# Patient Record
Sex: FEMALE | Race: WHITE | NOT HISPANIC OR LATINO | Employment: FULL TIME | URBAN - METROPOLITAN AREA
[De-identification: names, ages, dates, MRNs, and addresses within clinical notes are randomized per-mention and may not be internally consistent; named-entity substitution may affect disease eponyms.]

---

## 2023-01-25 PROBLEM — N93.9 ABNORMAL UTERINE BLEEDING (AUB): Status: ACTIVE | Noted: 2023-01-25

## 2023-01-25 PROBLEM — E66.9 OBESITY (BMI 30-39.9): Status: ACTIVE | Noted: 2023-01-25

## 2023-01-25 PROBLEM — F32.A DEPRESSION: Status: ACTIVE | Noted: 2023-01-25

## 2023-01-25 PROBLEM — F41.0 PANIC ATTACKS: Status: ACTIVE | Noted: 2023-01-25

## 2023-01-25 PROBLEM — R68.89 COLD INTOLERANCE: Status: ACTIVE | Noted: 2023-01-25

## 2023-01-25 PROBLEM — M54.2 CHRONIC NECK PAIN: Status: ACTIVE | Noted: 2023-01-25

## 2023-01-25 PROBLEM — N94.6 DYSMENORRHEA: Status: ACTIVE | Noted: 2023-01-25

## 2023-01-25 PROBLEM — G89.29 CHRONIC NECK PAIN: Status: ACTIVE | Noted: 2023-01-25

## 2023-01-25 PROBLEM — R53.83 FATIGUE: Status: ACTIVE | Noted: 2023-01-25

## 2023-01-25 PROBLEM — M54.6 ACUTE MIDLINE THORACIC BACK PAIN: Status: ACTIVE | Noted: 2023-01-25

## 2023-01-25 RX ORDER — ONDANSETRON 4 MG/1
1 TABLET, ORALLY DISINTEGRATING ORAL EVERY 8 HOURS PRN
COMMUNITY
End: 2023-04-04 | Stop reason: WASHOUT

## 2023-01-25 RX ORDER — MEDROXYPROGESTERONE ACETATE 10 MG/1
1 TABLET ORAL 2 TIMES DAILY
COMMUNITY
Start: 2020-03-06 | End: 2023-04-04 | Stop reason: WASHOUT

## 2023-01-25 RX ORDER — ASPIRIN 325 MG
TABLET ORAL
COMMUNITY
Start: 2020-03-06 | End: 2023-11-20 | Stop reason: WASHOUT

## 2023-01-25 RX ORDER — ESCITALOPRAM OXALATE 10 MG/1
1 TABLET ORAL DAILY
COMMUNITY
Start: 2021-07-30 | End: 2023-07-25 | Stop reason: SDUPTHER

## 2023-01-25 RX ORDER — BUSPIRONE HYDROCHLORIDE 15 MG/1
1 TABLET ORAL 3 TIMES DAILY PRN
COMMUNITY
Start: 2021-08-30 | End: 2023-08-30 | Stop reason: SDUPTHER

## 2023-01-25 RX ORDER — BACLOFEN 10 MG/1
1 TABLET ORAL 3 TIMES DAILY PRN
COMMUNITY
Start: 2022-01-13 | End: 2023-11-20 | Stop reason: SDUPTHER

## 2023-01-25 RX ORDER — FOLIC ACID/MULTIVIT,IRON,MINER 0.4MG-18MG
TABLET ORAL
COMMUNITY
Start: 2020-03-06 | End: 2023-04-04 | Stop reason: WASHOUT

## 2023-03-09 ENCOUNTER — APPOINTMENT (OUTPATIENT)
Dept: PRIMARY CARE | Facility: CLINIC | Age: 38
End: 2023-03-09

## 2023-04-03 NOTE — PROGRESS NOTES
"Subjective   Patient ID: Priya Lopez is a 37 y.o. female who presents for Panic Attack and Neck Pain    Panic Attacks:   Patient reports taking Lexapro 10 mg daily as prescribed and not excessively. Denies any side effects. Patient is reporting stability in condition. She would like to wean down on antidepressants next visit.  Her divorce is final and she has a new job that is less stressful.    Neck pain:   Priya Lopez is here to follow up on chronic neck pain. There are no new injuries reported. The pain medications provide adequate pain control. The patient reports no side effects with the medications. The patient is able to do their ADLs. Patient believes the pain is due to her jaw. She had her wisdom teeth taken out, believes they popped her jaw out. She went to see a provider for this. Went on a cruise and had acupuncture which did relieve some of the symptoms.     She reports left ankle pain and edema. She was wearing a brace and it helps a little. Is seeking a referral to podiatry. Left heel pain, worse in the morning with first steps. Does not go bare foot hardly at all.     Review of Systems   HENT:  Positive for dental problem.    Musculoskeletal:  Positive for gait problem and neck pain.        Left ankle pain and edema. Seeking a referral to podiatry.      /72   Pulse 78   Temp 36 °C (96.8 °F)   Resp 14   Ht 1.626 m (5' 4\")   Wt 95.9 kg (211 lb 6.4 oz)   SpO2 97%   BMI 36.29 kg/m²     Objective   Physical Exam  Vitals reviewed.   Constitutional:       Appearance: Normal appearance. She is obese.   Cardiovascular:      Rate and Rhythm: Normal rate and regular rhythm.      Pulses: Normal pulses.      Heart sounds: Normal heart sounds.   Pulmonary:      Effort: Pulmonary effort is normal.      Breath sounds: Normal breath sounds.   Abdominal:      General: Abdomen is flat.      Palpations: Abdomen is soft.   Musculoskeletal:         General: Tenderness present.      Cervical back: " Normal range of motion and neck supple.      Left lower leg: Edema present.      Comments: Left heel pain at the plantar fascia. Plus edema.   Neurological:      Mental Status: She is alert.       Assessment/Plan   Problem List Items Addressed This Visit          Nervous    Chronic neck pain - Primary    Overview      Is well controlled, continue with current medications.           Relevant Orders    Follow Up In [facility] - PCP       Musculoskeletal    Plantar fasciitis, bilateral    Overview      Is present and symptomatic, will need treatment. Referral to podiatry sent.          Relevant Orders    Referral to Podiatry       Endocrine/Metabolic    Obesity (BMI 30-39.9)    Overview      Is poorly controlled, therapeutic changes necessary.          Relevant Orders    Follow Up In [facility] - PCP       Other    Depression    Panic attacks    Overview      Is well controlled, continue with current medications.  Will start to wean off Buspar 15 mg to 1/2 tablet 3 times daily PRN.          Relevant Orders    Follow Up In [facility] - PCP       Follow-up in 2 months.    Scribe Attestation  By signing my name below, IMaría Elena , Scribdeb   attest that this documentation has been prepared under the direction and in the presence of Emory Wei MD.

## 2023-04-04 ENCOUNTER — OFFICE VISIT (OUTPATIENT)
Dept: PRIMARY CARE | Facility: CLINIC | Age: 38
End: 2023-04-04
Payer: COMMERCIAL

## 2023-04-04 VITALS
HEIGHT: 64 IN | WEIGHT: 211.4 LBS | SYSTOLIC BLOOD PRESSURE: 112 MMHG | DIASTOLIC BLOOD PRESSURE: 72 MMHG | OXYGEN SATURATION: 97 % | RESPIRATION RATE: 14 BRPM | TEMPERATURE: 96.8 F | HEART RATE: 78 BPM | BODY MASS INDEX: 36.09 KG/M2

## 2023-04-04 DIAGNOSIS — F41.0 PANIC ATTACKS: ICD-10-CM

## 2023-04-04 DIAGNOSIS — M72.2 PLANTAR FASCIITIS, BILATERAL: ICD-10-CM

## 2023-04-04 DIAGNOSIS — E66.9 OBESITY (BMI 30-39.9): ICD-10-CM

## 2023-04-04 DIAGNOSIS — M54.2 CHRONIC NECK PAIN: Primary | ICD-10-CM

## 2023-04-04 DIAGNOSIS — F32.4 MAJOR DEPRESSIVE DISORDER WITH SINGLE EPISODE, IN PARTIAL REMISSION (CMS-HCC): ICD-10-CM

## 2023-04-04 DIAGNOSIS — G89.29 CHRONIC NECK PAIN: Primary | ICD-10-CM

## 2023-04-04 PROBLEM — M25.562 LEFT KNEE PAIN: Status: ACTIVE | Noted: 2017-05-31

## 2023-04-04 PROBLEM — L91.8 INFLAMED ACROCHORDON: Status: ACTIVE | Noted: 2017-11-09

## 2023-04-04 PROBLEM — I67.1 CEREBRAL ANEURYSM, NONRUPTURED (HHS-HCC): Status: ACTIVE | Noted: 2017-03-16

## 2023-04-04 PROBLEM — K59.09 CHRONIC CONSTIPATION: Status: ACTIVE | Noted: 2017-03-17

## 2023-04-04 PROBLEM — N92.1 MENOMETRORRHAGIA: Status: ACTIVE | Noted: 2017-03-17

## 2023-04-04 PROCEDURE — 99214 OFFICE O/P EST MOD 30 MIN: CPT | Performed by: FAMILY MEDICINE

## 2023-04-04 PROCEDURE — 1036F TOBACCO NON-USER: CPT | Performed by: FAMILY MEDICINE

## 2023-04-04 RX ORDER — NORETHINDRONE 0.35 MG/1
1 TABLET ORAL DAILY
COMMUNITY
End: 2023-11-20 | Stop reason: WASHOUT

## 2023-04-04 ASSESSMENT — ENCOUNTER SYMPTOMS: NECK PAIN: 1

## 2023-05-25 ENCOUNTER — OFFICE VISIT (OUTPATIENT)
Dept: PRIMARY CARE | Facility: CLINIC | Age: 38
End: 2023-05-25
Payer: COMMERCIAL

## 2023-05-25 VITALS
DIASTOLIC BLOOD PRESSURE: 78 MMHG | SYSTOLIC BLOOD PRESSURE: 120 MMHG | TEMPERATURE: 98.3 F | HEIGHT: 64 IN | HEART RATE: 64 BPM | BODY MASS INDEX: 35.55 KG/M2 | RESPIRATION RATE: 16 BRPM | OXYGEN SATURATION: 99 % | WEIGHT: 208.2 LBS

## 2023-05-25 DIAGNOSIS — E66.9 OBESITY (BMI 30-39.9): ICD-10-CM

## 2023-05-25 DIAGNOSIS — F32.4 MAJOR DEPRESSIVE DISORDER WITH SINGLE EPISODE, IN PARTIAL REMISSION (CMS-HCC): Primary | ICD-10-CM

## 2023-05-25 DIAGNOSIS — M54.2 CHRONIC NECK PAIN: ICD-10-CM

## 2023-05-25 DIAGNOSIS — F41.0 PANIC ATTACKS: ICD-10-CM

## 2023-05-25 DIAGNOSIS — G89.29 CHRONIC NECK PAIN: ICD-10-CM

## 2023-05-25 DIAGNOSIS — M72.2 PLANTAR FASCIITIS, BILATERAL: ICD-10-CM

## 2023-05-25 PROCEDURE — 1036F TOBACCO NON-USER: CPT | Performed by: FAMILY MEDICINE

## 2023-05-25 PROCEDURE — 99213 OFFICE O/P EST LOW 20 MIN: CPT | Performed by: FAMILY MEDICINE

## 2023-05-25 NOTE — PROGRESS NOTES
"Subjective   Patient ID:  Priya Lopez is a 37 y.o. female patient who presents today for Anxiety and Depression (She would like to begin being weaned off her medication due to less stress in her life.)    Past Medical, Surgical, and Family History reviewed and updated in chart.     Reviewed all medications by prescribing practitioner or clinical pharmacist (such as prescriptions, OTCs, herbal therapies and supplements) and documented in the medical record.     Anxiety:  The patient is presenting today for a follow up of anxiety disorder. She denies having any current suicidal and/or homicidal ideation.  Patient denies any side effects to the medications. She would like to begin to be weaned off her medication due to the stress in her life has lessened.     OARRS report and controlled substance form reviewed.    Depression: Patient is presenting today for a follow up of depression. She voices that they are doing very well. The patient is compliant with medications. Patient denies any side effects to the medications.     The patient denies having the following symptoms: chest pain, chest pressure, fever, chills, N/V/D, constipation, dizziness, headaches, SOB.    Patient Care Team:  Emory Wei MD as PCP - General    Objective   Vitals:  /78   Pulse 64   Temp 36.8 °C (98.3 °F)   Resp 16   Ht 1.626 m (5' 4\")   Wt 94.4 kg (208 lb 3.2 oz)   SpO2 99%   BMI 35.74 kg/m²     Physical Exam  Vitals reviewed.   Constitutional:       Appearance: Normal appearance. She is obese.   HENT:      Right Ear: Tympanic membrane and ear canal normal.      Left Ear: Tympanic membrane and ear canal normal.      Mouth/Throat:      Pharynx: Oropharynx is clear.   Eyes:      Extraocular Movements: Extraocular movements intact.      Conjunctiva/sclera: Conjunctivae normal.      Pupils: Pupils are equal, round, and reactive to light.   Cardiovascular:      Rate and Rhythm: Normal rate and regular rhythm.      Heart sounds: " Normal heart sounds.   Pulmonary:      Effort: Pulmonary effort is normal. No respiratory distress.      Breath sounds: Normal breath sounds.   Abdominal:      General: There is no distension.      Palpations: There is no mass.      Tenderness: There is no abdominal tenderness. There is no guarding or rebound.      Hernia: No hernia is present.   Musculoskeletal:      Cervical back: Neck supple.   Skin:     General: Skin is warm and dry.   Neurological:      Mental Status: She is alert and oriented to person, place, and time.         Assessment/Plan   Problem List Items Addressed This Visit          Nervous    Chronic neck pain    Overview                Current Assessment & Plan      Is well controlled, continue with current medications.             Musculoskeletal    Plantar fasciitis, bilateral    Current Assessment & Plan      Is present and symptomatic, will need treatment. Referral to podiatry sent.             Endocrine/Metabolic    Obesity (BMI 30-39.9)    Overview      Is poorly controlled, therapeutic changes necessary.             Other    Depression - Primary    Current Assessment & Plan      Is well controlled, continue with current medications.          Panic attacks    Current Assessment & Plan      Is stable, continue with current treatment.             Follow up in: 6 months or sooner if needed with labs prior.     Scribe Attestation  By signing my name below, I, María Elena Benavidez , Lon   attest that this documentation has been prepared under the direction and in the presence of Emory Wei MD.

## 2023-07-25 DIAGNOSIS — F32.4 MAJOR DEPRESSIVE DISORDER WITH SINGLE EPISODE, IN PARTIAL REMISSION (CMS-HCC): ICD-10-CM

## 2023-07-25 RX ORDER — ESCITALOPRAM OXALATE 10 MG/1
10 TABLET ORAL DAILY
Qty: 30 TABLET | Refills: 5 | Status: SHIPPED | OUTPATIENT
Start: 2023-07-25 | End: 2023-08-30 | Stop reason: SDUPTHER

## 2023-07-25 NOTE — TELEPHONE ENCOUNTER
Recent Visits  Date Type Provider Dept   05/25/23 Office Visit Emory Wei MD Do [id number]   04/04/23 Office Visit Emory Wei MD Do [id number]   Showing recent visits within past 540 days and meeting all other requirements  Future Appointments  Date Type Provider Dept   11/20/23 Appointment Emory Wei MD Do [id number] [facility]   Showing future appointments within next 180 days and meeting all other requirements

## 2023-08-30 DIAGNOSIS — F32.4 MAJOR DEPRESSIVE DISORDER WITH SINGLE EPISODE, IN PARTIAL REMISSION (CMS-HCC): ICD-10-CM

## 2023-08-30 RX ORDER — ESCITALOPRAM OXALATE 10 MG/1
10 TABLET ORAL DAILY
Qty: 30 TABLET | Refills: 1 | Status: SHIPPED | OUTPATIENT
Start: 2023-08-30 | End: 2023-11-06 | Stop reason: SDUPTHER

## 2023-08-30 RX ORDER — BUSPIRONE HYDROCHLORIDE 15 MG/1
15 TABLET ORAL 3 TIMES DAILY PRN
Qty: 90 TABLET | Refills: 0 | Status: SHIPPED | OUTPATIENT
Start: 2023-08-30 | End: 2023-11-20 | Stop reason: SDUPTHER

## 2023-08-30 NOTE — TELEPHONE ENCOUNTER
Rx Refill Request Telephone Encounter    Name:  Priya Lopez  :  [id number]  Medication Name:  buspirone and lexapro  - the previous prescription was sent to the incorrect pharmacy   Specific Pharmacy location:  [facility]   Date of last appointment:  23   Date of next appointment:  23   Best number to reach patient:  [phone]

## 2023-11-06 DIAGNOSIS — F32.4 MAJOR DEPRESSIVE DISORDER WITH SINGLE EPISODE, IN PARTIAL REMISSION (CMS-HCC): Primary | ICD-10-CM

## 2023-11-06 RX ORDER — ESCITALOPRAM OXALATE 10 MG/1
10 TABLET ORAL DAILY
Qty: 30 TABLET | Refills: 1 | Status: SHIPPED | OUTPATIENT
Start: 2023-11-06 | End: 2023-11-20 | Stop reason: SDUPTHER

## 2023-11-06 NOTE — TELEPHONE ENCOUNTER
Rx Refill Request Telephone Encounter    Name:  Priya Lopez  :  [id number]  Medication Name:  LEXAPRO 10MG  Specific Pharmacy location:  [facility]   Date of last appointment:  23  Date of next appointment:  23  Best number to reach patient:  [phone]

## 2023-11-19 NOTE — PROGRESS NOTES
"Subjective    Patient ID: Priya Lopez is a 38 y.o. female who presents for Depression, Obesity, and Hypothyroidism (Patient states one side of her hair is growing longer than the other side. She was told he have her thyroid checked).     Depression: Patient is presenting today for a follow up of depression. She voices that they are doing marginally.     Hair thinning: Patient presents for a follow up of their thyroid function. Energy wise that patient is marginally. Patient states that one side of her hair is growing longer than the other side, she is inquiring about having her thyroid checked.     Obesity: The patient is present for a follow up for obesity. The patient is continuously working on diet and exercise. The patient will continue working on strategies to maintain weight loss and stay well hydrated.      Neck Pain: The patient is present today for a follow up of neck pain. Denies side effects from medications.     Aneurysm: The patient is present for a follow up of an aneurysm. She reports to be in stable condition.       The patient denies having the following symptoms: chest pain, chest pressure, fever, chills, N/V/D, constipation, dizziness, headaches, SOB.    Objective   Vitals:  /68   Pulse 72   Temp 36.2 °C (97.1 °F)   Resp 16   Ht 1.626 m (5' 4\")   Wt 99.9 kg (220 lb 3.2 oz)   SpO2 98%   BMI 37.80 kg/m²     Physical Exam  Vitals reviewed.   Constitutional:       Appearance: Normal appearance. She is obese.   Neck:      Vascular: No carotid bruit.   Cardiovascular:      Rate and Rhythm: Normal rate and regular rhythm.      Pulses: Normal pulses.      Heart sounds: Normal heart sounds.   Pulmonary:      Effort: Pulmonary effort is normal. No respiratory distress.      Breath sounds: Normal breath sounds. No wheezing.   Abdominal:      General: There is no distension.      Palpations: Abdomen is soft. There is no mass.      Tenderness: There is no abdominal tenderness. There is no " right CVA tenderness, left CVA tenderness, guarding or rebound.   Musculoskeletal:      Cervical back: Normal range of motion and neck supple. No rigidity.      Right lower leg: No edema.      Left lower leg: No edema.   Lymphadenopathy:      Cervical: No cervical adenopathy.   Neurological:      Mental Status: She is alert.          Labs active- future.     Assessment/Plan   Problem List Items Addressed This Visit       Chronic neck pain - Primary      Is well controlled, continue with current medications.          Relevant Medications    baclofen (Lioresal) 10 mg tablet    Depression      Is well controlled, continue with current medications.          Relevant Medications    busPIRone (Buspar) 15 mg tablet    escitalopram (Lexapro) 10 mg tablet    Aneurysm (CMS/HCC)      Is stable, continue with current treatment.          Cerebral aneurysm, nonruptured    Thinning hair      Is present and symptomatic, will need treatment.          Relevant Orders    CBC and Auto Differential    Comprehensive Metabolic Panel    Lipid Panel    Magnesium    TSH with reflex to Free T4 if abnormal    Follow Up In [facility] - PCP - Established       Follow up in: 6 month(s) or sooner if needed with labs prior.     Scribe Attestation  By signing my name below, I, Lon Solares   attest that this documentation has been prepared under the direction and in the presence of Emory Wei MD.

## 2023-11-20 ENCOUNTER — OFFICE VISIT (OUTPATIENT)
Dept: PRIMARY CARE | Facility: CLINIC | Age: 38
End: 2023-11-20
Payer: COMMERCIAL

## 2023-11-20 VITALS
HEART RATE: 72 BPM | TEMPERATURE: 97.1 F | HEIGHT: 64 IN | WEIGHT: 220.2 LBS | OXYGEN SATURATION: 98 % | DIASTOLIC BLOOD PRESSURE: 68 MMHG | RESPIRATION RATE: 16 BRPM | SYSTOLIC BLOOD PRESSURE: 124 MMHG | BODY MASS INDEX: 37.59 KG/M2

## 2023-11-20 DIAGNOSIS — G89.29 CHRONIC NECK PAIN: Primary | ICD-10-CM

## 2023-11-20 DIAGNOSIS — I72.9 ANEURYSM (CMS-HCC): ICD-10-CM

## 2023-11-20 DIAGNOSIS — F32.4 MAJOR DEPRESSIVE DISORDER WITH SINGLE EPISODE, IN PARTIAL REMISSION (CMS-HCC): ICD-10-CM

## 2023-11-20 DIAGNOSIS — L65.9 THINNING HAIR: ICD-10-CM

## 2023-11-20 DIAGNOSIS — M54.2 CHRONIC NECK PAIN: Primary | ICD-10-CM

## 2023-11-20 PROBLEM — N93.9 ABNORMAL UTERINE BLEEDING (AUB): Status: RESOLVED | Noted: 2023-01-25 | Resolved: 2023-11-20

## 2023-11-20 PROBLEM — N92.1 MENOMETRORRHAGIA: Status: RESOLVED | Noted: 2017-03-17 | Resolved: 2023-11-20

## 2023-11-20 PROCEDURE — 99214 OFFICE O/P EST MOD 30 MIN: CPT | Performed by: FAMILY MEDICINE

## 2023-11-20 PROCEDURE — 1036F TOBACCO NON-USER: CPT | Performed by: FAMILY MEDICINE

## 2023-11-20 RX ORDER — ESCITALOPRAM OXALATE 10 MG/1
10 TABLET ORAL DAILY
Qty: 30 TABLET | Refills: 5 | Status: SHIPPED | OUTPATIENT
Start: 2023-11-20

## 2023-11-20 RX ORDER — BACLOFEN 10 MG/1
10 TABLET ORAL 3 TIMES DAILY PRN
Qty: 90 TABLET | Refills: 5 | Status: SHIPPED | OUTPATIENT
Start: 2023-11-20

## 2023-11-20 RX ORDER — BUSPIRONE HYDROCHLORIDE 15 MG/1
15 TABLET ORAL 3 TIMES DAILY PRN
Qty: 90 TABLET | Refills: 5 | Status: SHIPPED | OUTPATIENT
Start: 2023-11-20

## 2023-12-08 ENCOUNTER — TELEPHONE (OUTPATIENT)
Dept: PRIMARY CARE | Facility: CLINIC | Age: 38
End: 2023-12-08
Payer: COMMERCIAL

## 2024-04-17 ENCOUNTER — HOSPITAL ENCOUNTER (OUTPATIENT)
Dept: RADIOLOGY | Facility: CLINIC | Age: 39
Discharge: HOME | End: 2024-04-17
Payer: COMMERCIAL

## 2024-04-17 DIAGNOSIS — S99.911A RIGHT ANKLE INJURY, INITIAL ENCOUNTER: ICD-10-CM

## 2024-04-17 PROCEDURE — 73610 X-RAY EXAM OF ANKLE: CPT | Mod: RIGHT SIDE | Performed by: RADIOLOGY

## 2024-04-17 PROCEDURE — 73610 X-RAY EXAM OF ANKLE: CPT | Mod: RT

## 2024-04-18 ENCOUNTER — TELEPHONE (OUTPATIENT)
Dept: PRIMARY CARE | Facility: CLINIC | Age: 39
End: 2024-04-18
Payer: COMMERCIAL

## 2024-04-30 ENCOUNTER — TELEPHONE (OUTPATIENT)
Dept: PRIMARY CARE | Facility: CLINIC | Age: 39
End: 2024-04-30
Payer: COMMERCIAL

## 2024-04-30 NOTE — TELEPHONE ENCOUNTER
Pt states she went to  in [place] for treatment of her right ankle. They prescribed her an air cast. Pt states she ordered one off [facility] instead and would like to know how long she should leave the air cast on for. Please advise.

## 2024-05-03 NOTE — TELEPHONE ENCOUNTER
I called and left a very detailed message for the pt and if she has any questions or concerns to call the office back.

## 2024-05-20 ENCOUNTER — APPOINTMENT (OUTPATIENT)
Dept: PRIMARY CARE | Facility: CLINIC | Age: 39
End: 2024-05-20
Payer: COMMERCIAL

## 2024-07-17 DIAGNOSIS — F32.4 MAJOR DEPRESSIVE DISORDER WITH SINGLE EPISODE, IN PARTIAL REMISSION (CMS-HCC): ICD-10-CM

## 2024-07-17 RX ORDER — ESCITALOPRAM OXALATE 10 MG/1
10 TABLET ORAL DAILY
Qty: 30 TABLET | Refills: 5 | Status: SHIPPED | OUTPATIENT
Start: 2024-07-17

## 2024-07-17 NOTE — TELEPHONE ENCOUNTER
Rx Refill Request     Name: Priya Lopez  :  1985   Medication Name:  escitalopram   Specific Pharmacy location:  [facility]   Date of last appointment:  2023  Date of next appointment:  Visit date not found   Best number to reach patient:  [phone]

## 2024-08-05 NOTE — PROGRESS NOTES
"Subjective   Patient ID: Priya Lopez is a 38 y.o. female who presents for Depression, Anxiety, Neck Pain, Obesity, and Rectal Pain (Having anal pain, have tired medication for it and has not helped. ).    Depression: Patient is presenting today for a follow up of depression. She voices that they are doing well. She is taking escitalopram 5 mg daily. She has gained weight despite eating better and exercising. She is doing well on 5 mg. She is planning to wean off the escitalopram.     Anxiety:  The patient is presenting today for a follow up of anxiety disorder. She denies having any current suicidal and/or homicidal ideation. She is taking buspirone 15 mg three times daily as needed.     Neck Pain: She is taking baclofen 10 mg three times daily as needed.     Rectal Pain: she has used baby cream, hemorrhoids ointments, and other which have not helped. She says that her perineum has had bumps and she went to the OB/GYN to get tested for STDs. She has researched her symptoms on [employer] and says that it might be perianal \"ridge\" or HPV. She says that the bumps in the area have enlarged. It hurts when she urinates and the it is associated with pruritus. She is in a monogamous relationship.     Right ankle injury: She is doing well. The x rays did not show any fracture or dislocation. There was mild edema. She has been wearing brace when she engages in physical activity,     She would like a test for her thyroid because her hair is still growing.         Objective   /80   Pulse 86   Temp 36.5 °C (97.7 °F)   Resp 16   Ht 1.626 m (5' 4\")   Wt 99.6 kg (219 lb 9.6 oz)   SpO2 99%   BMI 37.69 kg/m²     Physical Exam  Vitals reviewed.   Constitutional:       Appearance: Normal appearance.   Neck:      Vascular: No carotid bruit.   Cardiovascular:      Rate and Rhythm: Normal rate and regular rhythm.      Pulses: Normal pulses.      Heart sounds: Normal heart sounds.   Pulmonary:      Effort: Pulmonary effort " is normal. No respiratory distress.      Breath sounds: Normal breath sounds. No wheezing.   Abdominal:      General: There is no distension.      Palpations: Abdomen is soft. There is no mass.      Tenderness: There is no abdominal tenderness. There is no right CVA tenderness, left CVA tenderness, guarding or rebound.   Musculoskeletal:      Cervical back: Normal range of motion and neck supple. No rigidity.      Right lower leg: No edema.      Left lower leg: No edema.   Lymphadenopathy:      Cervical: No cervical adenopathy.   Neurological:      Mental Status: She is alert.     Picture of perineum shows some growths, question condyloma.    Labs reviewed from :    2020      Assessment/Plan   Problem List Items Addressed This Visit       Depression     Patient doing well. Has reduced the dose of escitalopram to 5 mg. Will discontinue escitalopram and monitor response.          Relevant Orders    CBC and Auto Differential    Comprehensive Metabolic Panel    Magnesium    Anxiety - Primary      Is stable, continue with current treatment.           Relevant Orders    Follow Up In [facility] - PCP - Established    Rectal pain     Will refer to Dr. Pedraza in general surgery for further evaluation.          Relevant Orders    Referral to General Surgery     Other Visit Diagnoses       Screening, lipid        Relevant Orders    Lipid Panel                Follow up in: 6 months or sooner if needed with labs today and prior to next appointment.     Scribe Attestation  By signing my name below, IAmanda , Lon   attest that this documentation has been prepared under the direction and in the presence of Emory Wei MD.

## 2024-08-06 ENCOUNTER — LAB (OUTPATIENT)
Dept: LAB | Facility: LAB | Age: 39
End: 2024-08-06
Payer: COMMERCIAL

## 2024-08-06 ENCOUNTER — OFFICE VISIT (OUTPATIENT)
Dept: PRIMARY CARE | Facility: CLINIC | Age: 39
End: 2024-08-06
Payer: COMMERCIAL

## 2024-08-06 VITALS
WEIGHT: 219.6 LBS | RESPIRATION RATE: 16 BRPM | HEIGHT: 64 IN | TEMPERATURE: 97.7 F | HEART RATE: 86 BPM | BODY MASS INDEX: 37.49 KG/M2 | SYSTOLIC BLOOD PRESSURE: 120 MMHG | OXYGEN SATURATION: 99 % | DIASTOLIC BLOOD PRESSURE: 80 MMHG

## 2024-08-06 DIAGNOSIS — K62.89 RECTAL PAIN: ICD-10-CM

## 2024-08-06 DIAGNOSIS — F32.4 MAJOR DEPRESSIVE DISORDER WITH SINGLE EPISODE, IN PARTIAL REMISSION (CMS-HCC): ICD-10-CM

## 2024-08-06 DIAGNOSIS — Z13.220 SCREENING, LIPID: ICD-10-CM

## 2024-08-06 DIAGNOSIS — L65.9 THINNING HAIR: ICD-10-CM

## 2024-08-06 DIAGNOSIS — F41.9 ANXIETY: Primary | ICD-10-CM

## 2024-08-06 LAB
ALBUMIN SERPL BCP-MCNC: 4.1 G/DL (ref 3.4–5)
ALP SERPL-CCNC: 65 U/L (ref 33–110)
ALT SERPL W P-5'-P-CCNC: 11 U/L (ref 7–45)
ANION GAP SERPL CALC-SCNC: 12 MMOL/L (ref 10–20)
AST SERPL W P-5'-P-CCNC: 13 U/L (ref 9–39)
BASOPHILS # BLD AUTO: 0.05 X10*3/UL (ref 0–0.1)
BASOPHILS NFR BLD AUTO: 0.5 %
BILIRUB SERPL-MCNC: 0.5 MG/DL (ref 0–1.2)
BUN SERPL-MCNC: 11 MG/DL (ref 6–23)
CALCIUM SERPL-MCNC: 9.3 MG/DL (ref 8.6–10.3)
CHLORIDE SERPL-SCNC: 102 MMOL/L (ref 98–107)
CHOLEST SERPL-MCNC: 175 MG/DL (ref 0–199)
CHOLESTEROL/HDL RATIO: 3.8
CO2 SERPL-SCNC: 27 MMOL/L (ref 21–32)
CREAT SERPL-MCNC: 0.68 MG/DL (ref 0.5–1.05)
EGFRCR SERPLBLD CKD-EPI 2021: >90 ML/MIN/1.73M*2
EOSINOPHIL # BLD AUTO: 0.1 X10*3/UL (ref 0–0.7)
EOSINOPHIL NFR BLD AUTO: 1.1 %
ERYTHROCYTE [DISTWIDTH] IN BLOOD BY AUTOMATED COUNT: 13.4 % (ref 11.5–14.5)
GLUCOSE SERPL-MCNC: 82 MG/DL (ref 74–99)
HCT VFR BLD AUTO: 38.5 % (ref 36–46)
HDLC SERPL-MCNC: 46.2 MG/DL
HGB BLD-MCNC: 12.4 G/DL (ref 12–16)
IMM GRANULOCYTES # BLD AUTO: 0.02 X10*3/UL (ref 0–0.7)
IMM GRANULOCYTES NFR BLD AUTO: 0.2 % (ref 0–0.9)
LDLC SERPL CALC-MCNC: 102 MG/DL
LYMPHOCYTES # BLD AUTO: 2.57 X10*3/UL (ref 1.2–4.8)
LYMPHOCYTES NFR BLD AUTO: 28.1 %
MAGNESIUM SERPL-MCNC: 1.53 MG/DL (ref 1.6–2.4)
MCH RBC QN AUTO: 28.4 PG (ref 26–34)
MCHC RBC AUTO-ENTMCNC: 32.2 G/DL (ref 32–36)
MCV RBC AUTO: 88 FL (ref 80–100)
MONOCYTES # BLD AUTO: 0.61 X10*3/UL (ref 0.1–1)
MONOCYTES NFR BLD AUTO: 6.7 %
NEUTROPHILS # BLD AUTO: 5.78 X10*3/UL (ref 1.2–7.7)
NEUTROPHILS NFR BLD AUTO: 63.4 %
NON HDL CHOLESTEROL: 129 MG/DL (ref 0–149)
NRBC BLD-RTO: 0 /100 WBCS (ref 0–0)
PLATELET # BLD AUTO: 303 X10*3/UL (ref 150–450)
POTASSIUM SERPL-SCNC: 4.3 MMOL/L (ref 3.5–5.3)
PROT SERPL-MCNC: 7 G/DL (ref 6.4–8.2)
RBC # BLD AUTO: 4.37 X10*6/UL (ref 4–5.2)
SODIUM SERPL-SCNC: 137 MMOL/L (ref 136–145)
TRIGL SERPL-MCNC: 135 MG/DL (ref 0–149)
TSH SERPL-ACNC: 1.99 MIU/L (ref 0.44–3.98)
VLDL: 27 MG/DL (ref 0–40)
WBC # BLD AUTO: 9.1 X10*3/UL (ref 4.4–11.3)

## 2024-08-06 PROCEDURE — 99214 OFFICE O/P EST MOD 30 MIN: CPT | Performed by: FAMILY MEDICINE

## 2024-08-06 PROCEDURE — 1036F TOBACCO NON-USER: CPT | Performed by: FAMILY MEDICINE

## 2024-08-06 PROCEDURE — 3008F BODY MASS INDEX DOCD: CPT | Performed by: FAMILY MEDICINE

## 2024-08-06 PROCEDURE — 85025 COMPLETE CBC W/AUTO DIFF WBC: CPT

## 2024-08-06 PROCEDURE — 80053 COMPREHEN METABOLIC PANEL: CPT

## 2024-08-06 PROCEDURE — 80061 LIPID PANEL: CPT

## 2024-08-06 PROCEDURE — 36415 COLL VENOUS BLD VENIPUNCTURE: CPT

## 2024-08-06 PROCEDURE — 83735 ASSAY OF MAGNESIUM: CPT

## 2024-08-06 PROCEDURE — 84443 ASSAY THYROID STIM HORMONE: CPT

## 2024-08-06 NOTE — ASSESSMENT & PLAN NOTE
Patient doing well. Has reduced the dose of escitalopram to 5 mg. Will discontinue escitalopram and monitor response.

## 2024-08-16 ENCOUNTER — APPOINTMENT (OUTPATIENT)
Dept: SURGERY | Facility: HOSPITAL | Age: 39
End: 2024-08-16
Payer: COMMERCIAL

## 2024-08-16 ENCOUNTER — APPOINTMENT (OUTPATIENT)
Dept: PRIMARY CARE | Facility: CLINIC | Age: 39
End: 2024-08-16
Payer: COMMERCIAL

## 2024-08-21 ENCOUNTER — APPOINTMENT (OUTPATIENT)
Dept: SURGERY | Facility: CLINIC | Age: 39
End: 2024-08-21
Payer: COMMERCIAL

## 2024-08-21 VITALS
SYSTOLIC BLOOD PRESSURE: 120 MMHG | BODY MASS INDEX: 37.39 KG/M2 | DIASTOLIC BLOOD PRESSURE: 70 MMHG | HEIGHT: 64 IN | WEIGHT: 219 LBS

## 2024-08-21 DIAGNOSIS — K62.89 RECTAL PAIN: ICD-10-CM

## 2024-08-21 PROCEDURE — 99204 OFFICE O/P NEW MOD 45 MIN: CPT | Performed by: SURGERY

## 2024-08-21 PROCEDURE — 3008F BODY MASS INDEX DOCD: CPT | Performed by: SURGERY

## 2024-08-21 RX ORDER — ASPIRIN 81 MG/1
81 TABLET ORAL DAILY
COMMUNITY

## 2024-08-21 RX ORDER — SPIRONOLACTONE 25 MG/1
25 TABLET ORAL DAILY
COMMUNITY

## 2024-08-21 RX ORDER — NORETHINDRONE ACETATE AND ETHINYL ESTRADIOL 1; 5 MG/1; UG/1
TABLET ORAL DAILY
COMMUNITY

## 2024-08-21 RX ORDER — HYDROCORTISONE 25 MG/G
CREAM TOPICAL 2 TIMES DAILY
Qty: 30 G | Refills: 1 | Status: SHIPPED | OUTPATIENT
Start: 2024-08-21

## 2024-08-21 NOTE — PROGRESS NOTES
Subjective   Patient ID: Priya Lopez is a 38 y.o. female who presents for New Patient Visit (Here for rectal pain, rectal bleeding. Genital warts in the rectal area. ).  HPI  Recently diagnosed with anogenital condyloma.  Describes itching, irritation.  Transient scant bleeding with bowel movements.  Intermittent hemorrhoids.  Recently evaluated by GYN to discuss management including topical creams versus ablation procedure.  No change in bowel habits.    Review of Systems   Constitutional: Negative.    HENT: Negative.     Eyes: Negative.    Respiratory: Negative.     Cardiovascular: Negative.    Gastrointestinal: Negative.    Endocrine: Negative.    Genitourinary: Negative.    Musculoskeletal: Negative.    Skin: Negative.    Allergic/Immunologic: Negative.    Neurological: Negative.    Hematological: Negative.    Psychiatric/Behavioral: Negative.           Past Medical History:   Diagnosis Date    Personal history of other diseases of the female genital tract     History of vulvovaginitis     Past Surgical History:   Procedure Laterality Date    OTHER SURGICAL HISTORY  03/06/2020    Gallbladder surgery    OTHER SURGICAL HISTORY  03/06/2020    Brain surgery     Family History   Problem Relation Name Age of Onset    No Known Problems Mother      No Known Problems Father      Breast cancer Other        Social History     Socioeconomic History    Marital status:    Tobacco Use    Smoking status: Never    Smokeless tobacco: Never   Substance and Sexual Activity    Alcohol use: Not Currently    Drug use: Yes     Types: Marijuana          Current Outpatient Medications:     aspirin 81 mg EC tablet, Take 1 tablet (81 mg) by mouth once daily., Disp: , Rfl:     baclofen (Lioresal) 10 mg tablet, Take 1 tablet (10 mg) by mouth 3 times a day as needed (pain)., Disp: 90 tablet, Rfl: 5    busPIRone (Buspar) 15 mg tablet, Take 1 tablet (15 mg) by mouth 3 times a day as needed (for panic)., Disp: 90 tablet, Rfl: 5     hydrocortisone (Anusol-HC) 2.5 % rectal cream, Insert into the rectum 2 times a day., Disp: 30 g, Rfl: 1    norethindrone ac-eth estradioL (Femhrt 1/5) 1-5 mg-mcg tablet, Take by mouth once daily., Disp: , Rfl:     spironolactone (Aldactone) 25 mg tablet, Take 1 tablet (25 mg) by mouth once daily., Disp: , Rfl:      Objective   Vitals:    08/21/24 1405   BP: 120/70      Physical Exam  Constitutional: Alert, no acute distress  HEENT: Well-developed, anicteric  Neck: Supple  Chest: Unlabored respirations  Heart: Regular  Abdomen: Soft, nondistended, nontender no masses [] appreciated  Rectal: Normal tone, no masses.  Grade 2 hemorrhoids.  Minimal external hemorrhoid burden.  No fissure or fistula.  Isolated small condyloma seen posterior perianal region.  Heavy condyloma burden across the perineal body widely extending to the vaginal orifice.  Extremities: Warm and well perfused, no edema  Psychiatric: Oriented appropriately, mood and affect appropriate    Assessment/Plan   Problem List Items Addressed This Visit             ICD-10-CM    Rectal pain K62.89    Relevant Medications    hydrocortisone (Anusol-HC) 2.5 % rectal cream     Gr 2 hemorrhoids, condyloma, pruritus ani-etiology and management all discussed.  Bowel regimen with stool softeners, increase fiber, sitz bath's.  Trial of hydrocortisone.  Minimal perianal condyloma with small isolated lesion seen.  Heavier burden involving the perineal body extensively and extending to the vaginal orifice.  Will defer to GYN for ongoing management of this.  No need for surgical intervention at this time.  Will follow-up as needed.    Aurora Pedraza MD

## 2024-08-29 ASSESSMENT — ENCOUNTER SYMPTOMS
CARDIOVASCULAR NEGATIVE: 1
EYES NEGATIVE: 1
ALLERGIC/IMMUNOLOGIC NEGATIVE: 1
HEMATOLOGIC/LYMPHATIC NEGATIVE: 1
PSYCHIATRIC NEGATIVE: 1
NEUROLOGICAL NEGATIVE: 1
MUSCULOSKELETAL NEGATIVE: 1
ENDOCRINE NEGATIVE: 1
RESPIRATORY NEGATIVE: 1
CONSTITUTIONAL NEGATIVE: 1
GASTROINTESTINAL NEGATIVE: 1

## 2024-10-11 ENCOUNTER — TELEPHONE (OUTPATIENT)
Dept: PRIMARY CARE | Facility: CLINIC | Age: 39
End: 2024-10-11

## 2024-10-11 DIAGNOSIS — F32.4 MAJOR DEPRESSIVE DISORDER WITH SINGLE EPISODE, IN PARTIAL REMISSION (CMS-HCC): Primary | ICD-10-CM

## 2024-10-15 ENCOUNTER — APPOINTMENT (OUTPATIENT)
Dept: PRIMARY CARE | Facility: CLINIC | Age: 39
End: 2024-10-15
Payer: COMMERCIAL

## 2024-10-15 VITALS
WEIGHT: 223.2 LBS | SYSTOLIC BLOOD PRESSURE: 120 MMHG | OXYGEN SATURATION: 98 % | HEIGHT: 64 IN | RESPIRATION RATE: 18 BRPM | HEART RATE: 78 BPM | BODY MASS INDEX: 38.1 KG/M2 | DIASTOLIC BLOOD PRESSURE: 68 MMHG | TEMPERATURE: 97.4 F

## 2024-10-15 DIAGNOSIS — I72.9 ANEURYSM (CMS-HCC): Primary | ICD-10-CM

## 2024-10-15 DIAGNOSIS — A63.0 GENITAL WARTS: ICD-10-CM

## 2024-10-15 DIAGNOSIS — Z01.818 PREOPERATIVE CLEARANCE: ICD-10-CM

## 2024-10-15 DIAGNOSIS — F32.4 MAJOR DEPRESSIVE DISORDER WITH SINGLE EPISODE, IN PARTIAL REMISSION (CMS-HCC): ICD-10-CM

## 2024-10-15 PROCEDURE — 99214 OFFICE O/P EST MOD 30 MIN: CPT | Performed by: FAMILY MEDICINE

## 2024-10-15 PROCEDURE — 3008F BODY MASS INDEX DOCD: CPT | Performed by: FAMILY MEDICINE

## 2024-10-15 PROCEDURE — 1036F TOBACCO NON-USER: CPT | Performed by: FAMILY MEDICINE

## 2024-10-15 NOTE — PROGRESS NOTES
"Subjective   Patient ID:  Priya Lopez is a 39 y.o. female patient who presents today for Pre-op Clearance (Pt needs to stop taking her ASA.).    Patient is scheduled for surgery on October 20 2024 with Dr. Kwon.  This will be done at [facility] and its removal of genital warts.  This patient does have aneurysm in her brain and has been taking aspirin.  It is okay to stop the aspirin for a week prior to surgery.  Depression: Reports taking medication as advised and denies side effects.  Denies suicidal or homicidal thoughts since last visit.  Remains able to perform normal activities of daily life. Currently reporting stability in this condition.  Objective   Vitals:  /68   Pulse 78   Temp 36.3 °C (97.4 °F)   Resp 18   Ht 1.626 m (5' 4\")   Wt 101 kg (223 lb 3.2 oz)   SpO2 98%   BMI 38.31 kg/m²       Physical Exam  Vitals reviewed.   Constitutional:       Appearance: Normal appearance.   Cardiovascular:      Rate and Rhythm: Normal rate and regular rhythm.      Pulses: Normal pulses.      Heart sounds: Normal heart sounds.   Pulmonary:      Effort: Pulmonary effort is normal.      Breath sounds: Normal breath sounds.   Abdominal:      General: Abdomen is flat.      Palpations: Abdomen is soft.   Musculoskeletal:      Cervical back: Normal range of motion and neck supple.   Neurological:      Mental Status: She is alert.         Labs reviewed from :    8/6/24   CMP, CBC, Lipid.       Assessment/Plan   Problem List Items Addressed This Visit       Depression    Current Assessment & Plan     This condition is well controlled, continue with current medications.         Aneurysm (CMS-HCC) - Primary    Current Assessment & Plan     This condition is stable, continue with current treatment.  Will discontinue aspirin prior to surgery.  Restart aspirin after surgery.         Genital warts    Current Assessment & Plan     Symptomatic and requiring surgical excision.         Preoperative " clearance    Current Assessment & Plan     The patient is an acceptable risk for the proposed surgery and anesthesia.                Follow up in:  February 6, 2025 or sooner if needed without labs prior.       Scribe Attestation  By signing my name below, I, Chrissy Segura , Scribe attest that this documentation has been prepared under the direction and in the presence of Emory Wei MD.

## 2024-10-16 NOTE — ASSESSMENT & PLAN NOTE
This condition is stable, continue with current treatment.  Will discontinue aspirin prior to surgery.  Restart aspirin after surgery.

## 2024-10-18 RX ORDER — BUSPIRONE HYDROCHLORIDE 15 MG/1
15 TABLET ORAL 3 TIMES DAILY PRN
Qty: 90 TABLET | Refills: 3 | Status: SHIPPED | OUTPATIENT
Start: 2024-10-18

## 2024-12-13 DIAGNOSIS — N94.6 DYSMENORRHEA: Primary | ICD-10-CM

## 2024-12-13 RX ORDER — NORETHINDRONE ACETATE AND ETHINYL ESTRADIOL 1; 5 MG/1; UG/1
1 TABLET ORAL DAILY
Qty: 30 TABLET | Refills: 5 | Status: SHIPPED | OUTPATIENT
Start: 2024-12-13

## 2024-12-13 NOTE — TELEPHONE ENCOUNTER
Rx Refill Request Telephone Encounter    Name:  Priya Lopez  :  [id number]  Medication Name:  norethindrone ac-eth estradioL (Femhrt ) 1-5 mg-mcg tablet     PT ASKED IF DR. HAN COULD TAKE THIS OVER FOR THE TIME BEING, AS SHE HAS NOT BEEN ABLE TO MAKE CONTACT WITH HER OB-GYN DOC. PLEASE ADVISE.    Specific Pharmacy location:  [facility]  Date of last appointment:  10/15/24  Date of next appointment:  25  Best number to reach patient:  [phone]

## 2024-12-16 ENCOUNTER — PATIENT MESSAGE (OUTPATIENT)
Dept: PRIMARY CARE | Facility: CLINIC | Age: 39
End: 2024-12-16
Payer: COMMERCIAL

## 2024-12-16 DIAGNOSIS — N94.6 DYSMENORRHEA: Primary | ICD-10-CM

## 2024-12-17 RX ORDER — NORETHINDRONE 0.35 MG/1
1 TABLET ORAL DAILY
Qty: 84 TABLET | Refills: 3 | Status: SHIPPED | OUTPATIENT
Start: 2024-12-17 | End: 2025-12-17

## 2025-02-06 ENCOUNTER — APPOINTMENT (OUTPATIENT)
Dept: PRIMARY CARE | Facility: CLINIC | Age: 40
End: 2025-02-06
Payer: COMMERCIAL

## 2025-02-06 VITALS
SYSTOLIC BLOOD PRESSURE: 124 MMHG | RESPIRATION RATE: 16 BRPM | DIASTOLIC BLOOD PRESSURE: 80 MMHG | TEMPERATURE: 97.7 F | HEIGHT: 64 IN | HEART RATE: 76 BPM | WEIGHT: 213.4 LBS | BODY MASS INDEX: 36.43 KG/M2 | OXYGEN SATURATION: 98 %

## 2025-02-06 DIAGNOSIS — M54.2 CHRONIC NECK PAIN: ICD-10-CM

## 2025-02-06 DIAGNOSIS — F32.4 MAJOR DEPRESSIVE DISORDER WITH SINGLE EPISODE, IN PARTIAL REMISSION (CMS-HCC): Primary | ICD-10-CM

## 2025-02-06 DIAGNOSIS — G89.29 CHRONIC NECK PAIN: ICD-10-CM

## 2025-02-06 DIAGNOSIS — F41.9 ANXIETY: ICD-10-CM

## 2025-02-06 DIAGNOSIS — I67.1 INTRACRANIAL ANEURYSM (HHS-HCC): ICD-10-CM

## 2025-02-06 PROCEDURE — 99214 OFFICE O/P EST MOD 30 MIN: CPT | Performed by: FAMILY MEDICINE

## 2025-02-06 PROCEDURE — 3008F BODY MASS INDEX DOCD: CPT | Performed by: FAMILY MEDICINE

## 2025-02-06 PROCEDURE — 1036F TOBACCO NON-USER: CPT | Performed by: FAMILY MEDICINE

## 2025-02-06 RX ORDER — SPIRONOLACTONE 25 MG/1
25 TABLET ORAL DAILY
Qty: 90 TABLET | Refills: 1 | Status: SHIPPED | OUTPATIENT
Start: 2025-02-06

## 2025-02-06 RX ORDER — BACLOFEN 10 MG/1
10 TABLET ORAL 3 TIMES DAILY PRN
Qty: 90 TABLET | Refills: 5 | Status: SHIPPED | OUTPATIENT
Start: 2025-02-06

## 2025-02-06 RX ORDER — BUSPIRONE HYDROCHLORIDE 15 MG/1
15 TABLET ORAL 3 TIMES DAILY PRN
Qty: 90 TABLET | Refills: 3 | Status: SHIPPED | OUTPATIENT
Start: 2025-02-06

## 2025-02-06 ASSESSMENT — ENCOUNTER SYMPTOMS
DEPRESSION: 0
LOSS OF SENSATION IN FEET: 0
OCCASIONAL FEELINGS OF UNSTEADINESS: 0

## 2025-02-06 ASSESSMENT — PATIENT HEALTH QUESTIONNAIRE - PHQ9
2. FEELING DOWN, DEPRESSED OR HOPELESS: NOT AT ALL
SUM OF ALL RESPONSES TO PHQ9 QUESTIONS 1 AND 2: 0
1. LITTLE INTEREST OR PLEASURE IN DOING THINGS: NOT AT ALL

## 2025-02-06 NOTE — PROGRESS NOTES
"Subjective   Patient ID:  Priya Lopez is a 39 y.o. female patient who presents today for Depression, Neck Pain, Intracranial aneurysm, and Obesity (Pt would like to discuss Ozempic. Pt want to know if she can use a jiggly plate to exercise).    Depression: Patient reports coping well with depression but has been experiencing increased anxiety. She had a panic attack this week but feels she is managing overall.    Neck Pain: Persistent neck pain continues. Patient recently purchased a new pillow and has been performing exercises for relief. She continues to use baclofen.      Intracranial Aneurysm: Last evaluation indicated aneurysm shrinkage. However, due to recent migraines, the patient requests an MRI to assess for any changes. MRI order placed.     Obesity: Patient has lost 10 pounds through exercise. Inquires about the safety of using Ozempic but is postponing initiation due to cost. Will continue with dietary modifications and regular exercise. Patient is planning to conceive and inquires about optimizing health for pregnancy. She follows with OB/GYN.       Objective   Vitals:  /80   Pulse 76   Temp 36.5 °C (97.7 °F)   Resp 16   Ht 1.626 m (5' 4\")   Wt 96.8 kg (213 lb 6.4 oz)   SpO2 98%   BMI 36.63 kg/m²       Physical Exam  Vitals reviewed.   Constitutional:       Appearance: Normal appearance.   Neck:      Vascular: No carotid bruit.   Cardiovascular:      Rate and Rhythm: Normal rate and regular rhythm.      Pulses: Normal pulses.      Heart sounds: Normal heart sounds.   Pulmonary:      Effort: Pulmonary effort is normal. No respiratory distress.      Breath sounds: Normal breath sounds. No wheezing.   Abdominal:      General: There is no distension.      Palpations: Abdomen is soft. There is no mass.      Tenderness: There is no abdominal tenderness. There is no right CVA tenderness, left CVA tenderness, guarding or rebound.   Musculoskeletal:      Cervical back: Normal range of motion " and neck supple. No rigidity.      Right lower leg: No edema.      Left lower leg: No edema.   Lymphadenopathy:      Cervical: No cervical adenopathy.   Neurological:      Mental Status: She is alert.           Assessment/Plan   Problem List Items Addressed This Visit          Mental Health    Depression - Primary    Current Assessment & Plan      Is stable, continue with current treatment.           Relevant Medications    busPIRone (Buspar) 15 mg tablet    Other Relevant Orders    Follow Up In [facility] - PCP - Established    Anxiety    Current Assessment & Plan      Is stable, continue with current treatment.           Relevant Medications    spironolactone (Aldactone) 25 mg tablet       Musculoskeletal and Injuries    Chronic neck pain    Current Assessment & Plan      Is stable, continue with current treatment.           Relevant Medications    baclofen (Lioresal) 10 mg tablet     Other Visit Diagnoses       Intracranial aneurysm ([facility])        Relevant Orders    MR angio head w and wo IV contrast            Follow up in: 6 month(s) or sooner if needed without labs prior.       Scribe Attestation  By signing my name below, IChrissy , Scribe attest that this documentation has been prepared under the direction and in the presence of Emory Wei MD.

## 2025-03-03 ENCOUNTER — HOSPITAL ENCOUNTER (OUTPATIENT)
Dept: RADIOLOGY | Facility: HOSPITAL | Age: 40
Discharge: HOME | End: 2025-03-03
Payer: COMMERCIAL

## 2025-03-03 DIAGNOSIS — I67.1 INTRACRANIAL ANEURYSM (HHS-HCC): ICD-10-CM

## 2025-03-03 PROCEDURE — 70546 MR ANGIOGRAPH HEAD W/O&W/DYE: CPT

## 2025-03-03 PROCEDURE — 2550000001 HC RX 255 CONTRASTS: Performed by: FAMILY MEDICINE

## 2025-03-03 PROCEDURE — A9575 INJ GADOTERATE MEGLUMI 0.1ML: HCPCS | Performed by: FAMILY MEDICINE

## 2025-03-03 RX ORDER — GADOTERATE MEGLUMINE 376.9 MG/ML
0.2 INJECTION INTRAVENOUS
Status: COMPLETED | OUTPATIENT
Start: 2025-03-03 | End: 2025-03-03

## 2025-03-03 RX ADMIN — GADOTERATE MEGLUMINE 19.5 ML: 376.9 INJECTION INTRAVENOUS at 17:54

## 2025-03-04 DIAGNOSIS — J32.9 SINUSITIS, UNSPECIFIED CHRONICITY, UNSPECIFIED LOCATION: ICD-10-CM

## 2025-03-05 ENCOUNTER — TELEPHONE (OUTPATIENT)
Dept: PRIMARY CARE | Facility: CLINIC | Age: 40
End: 2025-03-05
Payer: COMMERCIAL

## 2025-03-05 DIAGNOSIS — J01.30 SUBACUTE SPHENOIDAL SINUSITIS: Primary | ICD-10-CM

## 2025-03-05 NOTE — TELEPHONE ENCOUNTER
Dr. Serna can't get pablo in till next Friday and she called us back to let us know but also she's dying in pain from the sinus infection and was wondering if there was anything dr. Wei could give her until she can see dr. Serna please advise

## 2025-03-06 RX ORDER — AMOXICILLIN AND CLAVULANATE POTASSIUM 875; 125 MG/1; MG/1
875 TABLET, FILM COATED ORAL 2 TIMES DAILY
Qty: 28 TABLET | Refills: 0 | Status: SHIPPED | OUTPATIENT
Start: 2025-03-06 | End: 2025-03-20

## 2025-03-14 ENCOUNTER — APPOINTMENT (OUTPATIENT)
Dept: OTOLARYNGOLOGY | Facility: CLINIC | Age: 40
End: 2025-03-14
Payer: COMMERCIAL

## 2025-03-14 DIAGNOSIS — J01.30 ACUTE NON-RECURRENT SPHENOIDAL SINUSITIS: Primary | ICD-10-CM

## 2025-03-14 DIAGNOSIS — J32.9 SINUSITIS, UNSPECIFIED CHRONICITY, UNSPECIFIED LOCATION: ICD-10-CM

## 2025-03-14 PROCEDURE — 99203 OFFICE O/P NEW LOW 30 MIN: CPT | Performed by: OTOLARYNGOLOGY

## 2025-03-14 RX ORDER — PREDNISONE 20 MG/1
TABLET ORAL
Qty: 9 TABLET | Refills: 0 | Status: SHIPPED | OUTPATIENT
Start: 2025-03-14 | End: 2025-03-21

## 2025-03-14 NOTE — PROGRESS NOTES
Subjective   Patient ID: Priya Lopez is a 39 y.o. female who presents for No chief complaint on file.  She is seen today through the kind request Dr. Wei.      HPI  New patient being seen for sinusitis. Has been experiencing headaches, dark yellow/green nasal drainage for 5 weeks. PCP prescribed Augmentin earlier this week which patient has not started yet. All remaining head neck inquiry otherwise negative.     MR Angio completed 3/3/25 for unrelated issue showed mucosal thickening of left sphenoid sinus.     Review of Systems   Constitutional: Negative.    HENT: Negative.     Respiratory: Negative.     Cardiovascular: Negative.    Neurological: Negative.      Physical Exam  General appearance: No acute distress. Normal facies. Symmetric facial movement. No gross lesions of the face are noted.  Ears:  The external ear structures appear normal. The ear canals patent and the tympanic membranes are intact without evidence of air-fluid levels, retraction, or congenital defects.    Nose:  Anterior rhinoscopy notes essentially a midline nasal septum. Examination is noted for normal healthy mucosal membranes without any evidence of lesions, polyps, or exudate.   Throat/Oral mucosa:  The tongue is normally mobile. There are no lesions on the gingiva, buccal, or oral mucosa. There are no oral cavity masses.  Neck:  The neck is negative for mass lymphadenopathy. The trachea and parotid are clear. The thyroid bed is grossly unremarkable. The salivary gland structures are grossly unremarkable.    Assessment/Plan   Acute non-recurrent sphenoid sinusitis. Would recommend that patient take Augmentin as prescribed by PCP. Also would add 2 sprays of Nasacort daily, along with 7-day course of prednisone. Patient to update me if no relief following those medications.

## 2025-05-22 ENCOUNTER — OFFICE VISIT (OUTPATIENT)
Facility: CLINIC | Age: 40
End: 2025-05-22
Payer: COMMERCIAL

## 2025-05-22 VITALS — BODY MASS INDEX: 34.62 KG/M2 | HEIGHT: 65 IN | WEIGHT: 207.8 LBS

## 2025-05-22 DIAGNOSIS — J01.30 ACUTE NON-RECURRENT SPHENOIDAL SINUSITIS: Primary | ICD-10-CM

## 2025-05-22 DIAGNOSIS — J34.89 SINUS PAIN: ICD-10-CM

## 2025-05-22 DIAGNOSIS — R51.9 SEVERE HEADACHE: ICD-10-CM

## 2025-05-22 PROCEDURE — 3008F BODY MASS INDEX DOCD: CPT | Performed by: PHYSICIAN ASSISTANT

## 2025-05-22 PROCEDURE — 99204 OFFICE O/P NEW MOD 45 MIN: CPT | Performed by: PHYSICIAN ASSISTANT

## 2025-05-22 ASSESSMENT — PATIENT HEALTH QUESTIONNAIRE - PHQ9
SUM OF ALL RESPONSES TO PHQ9 QUESTIONS 1 AND 2: 1
1. LITTLE INTEREST OR PLEASURE IN DOING THINGS: NOT AT ALL
2. FEELING DOWN, DEPRESSED OR HOPELESS: SEVERAL DAYS

## 2025-05-22 ASSESSMENT — PAIN SCALES - GENERAL: PAINLEVEL_OUTOF10: 4

## 2025-05-22 NOTE — PROGRESS NOTES
Facial Plastic & Reconstructive Surgery    Reason for consult: Sinusitis    Chief Complaint: Severe headache    Referring Provider: Self    Previous Otolaryngology Provider: Dr. Swanson    Previous documentation for this patient was extensively reviewed including specific reasons for evaluation. Complex nature of complaint and medical issues prompting evaluation for surgical consideration by facial plastic & reconstructive surgeon.    Priya Lopez is a 39 y.o. 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 Had MRA of head and 3/3/25 imaging showed enhancing fluid of left sphenoid sinus.     Denies nasal drainage of yellow or greenish color; endorses clear mucous only  Endorses severe headache several times a day lasting approximately 30 seconds since for past few months; debilitating  Denies any nasal congestion or difficulty with nasal breathing.     IMAGING: I personally reviewed the MR Head from  3/3/25 and this is my impression: Left sphenoid sinus bony opacification; Posterior left septal bony spur.  Otherwise midline septum.     Previous nasal surgery: No    Trauma history: No    Sleep apnea or snoring history: No    Allergic rhinitis, sinusitis history: Sinusitis recurrent since    Smoking: No    Aesthetic concern: No    Past Medical History  She has a past medical history of Personal history of other diseases of the female genital tract.    Surgical History  She has a past surgical history that includes Other surgical history (03/06/2020) and Other surgical history (03/06/2020).     Social History  She reports that she has never smoked. She has never used smokeless tobacco. She reports that she does not currently use alcohol. She reports current drug use. Drug: Marijuana.    Family History  Family History[1]     Allergies  Latex    Physical exam    General: Well-developed and well-nourished in  appearance.  Skin: No rashes or concerning lesions on the visible portions of the skin.  Eyes: Extraocular movements intact. Visual fields grossly normal.  Ears: Pinna are normal in shape and position. External canals are patent.  Oral Cavity/Oropharynx: Dentition is intact. Mucous membranes moist. No masses or lesions.  Respiratory: No respiratory distress. Quiet breathing without stertor or stridor.  Cardiovascular: Regular rate and rhythm. Warm extremities with equal pulses.  Psych: Normal mood and affect. Judgement and insight appropriate.  Neuro: Alert and oriented. CN II-XII grossly intact. No focal deficits.  Musculoskeletal: Gait intact. Moves all extremities well without apparent deformities.      Assessment - This patient has a significant mechanical nasal obstruction. The cause is multifactorial with septal deviation with severe internal nasal valve narrowing, turbinate hypertrophy, and static internal nasal valve collapse. There is some dynamic nasal valve collapse as well. Correction of this nasal obstruction will require a septoplasty, repair of nasal valve collapse with structural grafting, and a bilateral turbinate reduction. We discussed the medical necessity of this at length, as well as the risks and limitations of the procedures. All questions were answered.  Plan - Recommend reconstructive septoplasty, nasal valve repair with structural grafting, and inferior turbinate reduction with lateralization.  CT Sinus w/o ordered for review by Dr. Lowe for possible review for Sphenoid FESS referral  NeilMed rinses 3 x daily to help with acute symptoms    Cedric Hartman PA-C         [1]   Family History  Problem Relation Name Age of Onset    No Known Problems Mother      No Known Problems Father      Breast cancer Other

## 2025-05-27 ENCOUNTER — HOSPITAL ENCOUNTER (OUTPATIENT)
Dept: RADIOLOGY | Facility: HOSPITAL | Age: 40
Discharge: HOME | End: 2025-05-27
Payer: COMMERCIAL

## 2025-05-27 DIAGNOSIS — J01.30 ACUTE NON-RECURRENT SPHENOIDAL SINUSITIS: ICD-10-CM

## 2025-05-27 PROCEDURE — 70486 CT MAXILLOFACIAL W/O DYE: CPT

## 2025-05-30 DIAGNOSIS — R51.9 SEVERE HEADACHE: ICD-10-CM

## 2025-06-17 NOTE — PROGRESS NOTES
Sinus & Skull Base Surgery    Chief Complaint:  Sinus problems    History Of Present Illness:  Reason For Visit:   Priya Lopez presents to me for evaluation of sinus problems.  Her symptoms started late last year.  She developed pain behind or above her eyes or in the middle of her head and this would last for 10 to 15 seconds and consist of sharp pain.  This can happen twice a week with no specific trigger.  She was evaluated by Dr. Swanson.  She scheduled an evaluation with neurology but this is several months in the future.    She has a prior history of brain aneurysm that was coiled through the [facility].  At that time, this helped her migraines that she was having.  She gets visual migraines occasionally and thus far this year she has had two.    She also mentioned some eye twitching and questions whether or not this is related to stress.  It can be unilateral or bilateral.  It can last for a few minutes and then stop.  It varies from side-to-side and this has been going on for 3 to 4 months.    Main Symptoms:  Patient has anterior nasal drainage.  In the AM.  Patient does not have posterior nasal drainage.    Patient does not have nasal airway obstruction.   Patient has facial pain.  < 50 % of the time  Patient has facial pressure.  < 50 % of the time  Patient does not have decreased sense of smell.   Associated Symptoms:   Patient has headaches.    Patient has throat clearing.  Occasionally.  Patient has coughing.  Occasionally.  Patient has dysphonia. Occasionally.  Patient does not have nasal bleeding.    Medications currently on for sinonasal symptoms:  None.    Medications tried in the past for sinonasal symptoms:  Zyrtec D  Prednisone  Augmentin  Nasacort    Other Pertinent Medical Conditions:   Patient does not have asthma.    Patient does not have aspirin sensitivity.    Patient has migraines.    Patient does not have history of allergy testing.   Patient does not have history of  "sinus surgery.    Patient does not have history of nasal fracture.    Patient has heartburn.  Rare.  The patient does not take medical therapy for heartburn.   The patient has imaging of sinuses. When: 5/27/2025. See \"Results/Data\" section below.    Active Problems:  Problem List[1]    Past Medical History:  She has a past medical history of Personal history of other diseases of the female genital tract.    Surgical History:  She has a past surgical history that includes Other surgical history (03/06/2020) and Other surgical history (03/06/2020).     Family History:  Family History[2]    Social History:  She reports that she has never smoked. She has never used smokeless tobacco. She reports that she does not currently use alcohol. She reports current drug use. Drug: Marijuana.     Allergies:  Latex    Current Meds:  Current Medications[3]    Vitals:  Visit Vitals  Ht 1.651 m (5' 5\")   Wt 94.8 kg (209 lb)   BMI 34.78 kg/m²   OB Status Having periods   Smoking Status Never   BSA 2.09 m²     Physical Exam:  CONSTITUTIONAL:  Vitals reviewed in nursing chart, well developed, well nourished.    RESPIRATION:  Breathing comfortably, no stridor.  CV:  No clubbing/cyanosis/edema in hands.  EYES:  EOM Intact, sclera normal.  NEURO:  Alert and oriented times 3, Cranial nerves 2-12 intact and symmetric bilaterally.  HEAD AND FACE:  Skin with no masses or lesions, sinuses nontender to palpation.  SALIVARY GLANDS:  Parotid and submandibular glands normal bilaterally.  EARS:  Normal external ears, external auditory canals, and TMs to otoscopy, normal hearing to whispered voice.  NOSE:  External nose midline, anterior rhinoscopy is normal with limited visualization to the anterior aspect of the inferior turbinates (see nasal endoscopy).  ORAL CAVITY/OROPHARYNX/LIPS:  Normal mucous membranes, normal floor of mouth/tongue/OP, no masses or lesions are noted.  NECK/LYMPH:  No LAD, no thyroid masses.    SINONASAL ENDOSCOPY (CPT 81094): " To better evaluate the patient's symptoms, sinonasal endoscopy is indicated.  After discussion of risks and benefits, and topical decongestion and anesthesia, an endoscope was used to perform nasal endoscopy on each side.  A time out identifying the patient, the procedure, the location of the procedure and any concerns was performed prior to beginning the procedure.    Findings:  Examination of the right nasal cavity revealed a deviated nasal septum. This made it difficult to visualize the middle meatus. The sphenoethmoid recess was normal. Examination of the left nasal cavity revealed an inferior septal deviation to the left. The middle meatus and sphenoethmoid recess were normal without pus or polyps    Results/Data:  I personally reviewed the CT sinus dated 5/27/2025.  She has a septal deviation to the left.  Minimal inflammation within the left frontoethmoidal region, left sphenoid and left posterior ethmoid.  I personally reviewed an MRI angio head from March 3, 2025.  There were more significant inflammatory changes in the left sphenoid and left maxillary sinus on that study.    The impression from the MRI is listed below:  IMPRESSION:  Right internal carotid artery stent seen extending from the proximal cavernous segment to the clinoid segment without evidence of focal dissection, obstruction, or remnant aneurysm.      T2 hyperintense fluid can be seen within the left sphenoid sinus and sphenoid recess with an associated air-fluid level as well as hyperostosis and mucosal thickening of the sphenoid walls. Trace amounts of fluid within the left maxillary sinus. Findings are consistent with sphenoid sinusitis; however, isolated disease of this extent raises concern for an obstructive lesion. Recommend direct visualization if possible.    Provider Impressions:  1.  Facial pain and pressure, headaches, migraine history  2.  Rhinorrhea  3.  Throat clearing, coughing, dysphonia  4.  Deviated nasal septum to the  left    Discussion:  Priya Mamibee and I discussed her exam and symptoms.  What she is describing in regard to seconds of a sharp pain sounds like trigeminal neuralgia or neuralgia of a different nerve supply.  I will email Dr. Walker with headache neurology to determine the best pathway to get her scheduled to see one of the headache providers.    In regard to her current imaging, there were some inflammatory changes on the most recent CT but this is improved from her MRI from March 2025.  I would not recommend surgical intervention at this point in time.  We certainly could consider further imaging in the future simply to assess for changes in the inflammation over time but I would recommend holding off on this in the short-term particularly pending her neurology workup.    She has been treated with a number of oral medical therapies for symptoms.  She certainly would have additional options in regard to topical therapy but I do not think this would improve the sharp pain that she has been experiencing but we can certainly discuss this in more detail if these issues persist despite a thoughtful neurology workup.    I recommended repeat assessment in about 3 to 4 months virtually.  All questions were answered.    Patient Discussion/Summary:  Welcome to Dr. Aron Scales's clinic.  He is an ENT physician that specializes in nose, sinus, and skull base disorders.    Dr. Aron Scales's office number is [phone].  Please call this number to contact his care team regardless of which office you use to access care.  This number is the most direct way to communicate with all the members of the care team.    His care team includes:    [place]:  Marcelle Enriquez  Available to receive calls Monday through Friday from 8:00 am until 4:25 pm  She can help you with scheduling of appointments and any general, non-medical questions about the practice    Primary nurse:  Pili Lan, RN, BSN  Available to receive  calls Tuesday through Friday from 8:00 am until 4:25 pm  Pili is also Dr. Scales's surgery scheduler and will assist you with planning and scheduling of your surgery during her office hours    Nery Hutchins, RN, BSN  Available to receive calls Monday through Thursday from 8:00 am until 4:25 pm    Rhinology Nurse Practitioner:  Nery Posada CNP (sees patients in [place], [facility], and [facility])  She works collaboratively with Dr. Scales.  If a more urgent appointment is needed she is a wonderful resource.    Feng Sanderson MD (sees patients in [place] and [facility])  Urvashi Bob MD (sees patients in [facility], [facility], and [place])  Dr. Scales's partners in the division of Rhinology    For your convenience, Dr. Scales sees patients at [facility] and [facility].  While we try to make your appointments as convenient as possible, occasionally a visit to another location may be necessary to provide the best care for you.    We look forward to working with you to meet your healthcare goals.    Scribe Attestation  By signing my name below, I, Lon Joel, attest that this documentation has been prepared under the direction and in the presence of Aron Scales MD.    Signature:  Aron Scales MD       [1]   Patient Active Problem List  Diagnosis    Chronic neck pain    Acute midline thoracic back pain    Cold intolerance    Depression    Fatigue    Obesity (BMI 30-39.9)    Anxiety    Dysmenorrhea    Aneurysm    Chronic constipation    Disorder of optic disc    Inflamed acrochordon    Left knee pain    Plantar fasciitis, bilateral    Thinning hair    Rectal pain    Genital warts    Preoperative clearance    Acute non-recurrent sphenoidal sinusitis    Sinus pain    Severe headache   [2]   Family History  Problem Relation Name Age of Onset    No Known Problems Mother      No Known Problems Father      Breast cancer Other     [3]   Current Outpatient  Medications:     aspirin 81 mg EC tablet, Take 1 tablet (81 mg) by mouth once daily., Disp: , Rfl:     baclofen (Lioresal) 10 mg tablet, Take 1 tablet (10 mg) by mouth 3 times a day as needed (pain)., Disp: 90 tablet, Rfl: 5    busPIRone (Buspar) 15 mg tablet, Take 1 tablet (15 mg) by mouth 3 times a day as needed (for panic)., Disp: 90 tablet, Rfl: 3    norethindrone (Ronda) 0.35 mg tablet, Take 1 tablet (0.35 mg) over 28 days by mouth once daily., Disp: 84 tablet, Rfl: 3    spironolactone (Aldactone) 25 mg tablet, Take 1 tablet (25 mg) by mouth once daily., Disp: 90 tablet, Rfl: 1

## 2025-06-18 ENCOUNTER — APPOINTMENT (OUTPATIENT)
Facility: CLINIC | Age: 40
End: 2025-06-18
Payer: COMMERCIAL

## 2025-06-18 VITALS — HEIGHT: 65 IN | BODY MASS INDEX: 34.82 KG/M2 | WEIGHT: 209 LBS

## 2025-06-18 DIAGNOSIS — R44.8 FACIAL PRESSURE: ICD-10-CM

## 2025-06-18 DIAGNOSIS — J34.2 DEVIATED NASAL SEPTUM: Primary | ICD-10-CM

## 2025-06-18 DIAGNOSIS — J32.2 CHRONIC ETHMOIDAL SINUSITIS: ICD-10-CM

## 2025-06-18 PROCEDURE — 99214 OFFICE O/P EST MOD 30 MIN: CPT | Performed by: OTOLARYNGOLOGY

## 2025-06-18 PROCEDURE — 31231 NASAL ENDOSCOPY DX: CPT | Performed by: OTOLARYNGOLOGY

## 2025-06-18 PROCEDURE — 3008F BODY MASS INDEX DOCD: CPT | Performed by: OTOLARYNGOLOGY

## 2025-06-18 ASSESSMENT — PATIENT HEALTH QUESTIONNAIRE - PHQ9
2. FEELING DOWN, DEPRESSED OR HOPELESS: SEVERAL DAYS
1. LITTLE INTEREST OR PLEASURE IN DOING THINGS: SEVERAL DAYS
SUM OF ALL RESPONSES TO PHQ9 QUESTIONS 1 AND 2: 2

## 2025-06-25 ENCOUNTER — OFFICE VISIT (OUTPATIENT)
Dept: NEUROLOGY | Facility: CLINIC | Age: 40
End: 2025-06-25
Payer: COMMERCIAL

## 2025-06-25 VITALS
HEIGHT: 65 IN | BODY MASS INDEX: 34.39 KG/M2 | SYSTOLIC BLOOD PRESSURE: 91 MMHG | HEART RATE: 80 BPM | DIASTOLIC BLOOD PRESSURE: 56 MMHG | WEIGHT: 206.4 LBS | TEMPERATURE: 96.8 F | RESPIRATION RATE: 16 BRPM

## 2025-06-25 DIAGNOSIS — Z98.890 HX OF CEREBRAL ANEURYSM REPAIR: ICD-10-CM

## 2025-06-25 DIAGNOSIS — F41.9 ANXIETY: ICD-10-CM

## 2025-06-25 DIAGNOSIS — G47.33 OSA (OBSTRUCTIVE SLEEP APNEA): ICD-10-CM

## 2025-06-25 DIAGNOSIS — G50.0 TRIGEMINAL NEURALGIA: Primary | ICD-10-CM

## 2025-06-25 DIAGNOSIS — R51.9 SEVERE HEADACHE: Primary | ICD-10-CM

## 2025-06-25 DIAGNOSIS — Z86.79 HX OF CEREBRAL ANEURYSM REPAIR: ICD-10-CM

## 2025-06-25 PROCEDURE — 3008F BODY MASS INDEX DOCD: CPT | Performed by: NURSE PRACTITIONER

## 2025-06-25 PROCEDURE — 99214 OFFICE O/P EST MOD 30 MIN: CPT | Performed by: NURSE PRACTITIONER

## 2025-06-25 PROCEDURE — 99204 OFFICE O/P NEW MOD 45 MIN: CPT | Performed by: NURSE PRACTITIONER

## 2025-06-25 ASSESSMENT — ANXIETY QUESTIONNAIRES
1. FEELING NERVOUS, ANXIOUS, OR ON EDGE: MORE THAN HALF THE DAYS
7. FEELING AFRAID AS IF SOMETHING AWFUL MIGHT HAPPEN: SEVERAL DAYS
4. TROUBLE RELAXING: SEVERAL DAYS
5. BEING SO RESTLESS THAT IT IS HARD TO SIT STILL: NOT AT ALL
3. WORRYING TOO MUCH ABOUT DIFFERENT THINGS: MORE THAN HALF THE DAYS
GAD7 TOTAL SCORE: 8
2. NOT BEING ABLE TO STOP OR CONTROL WORRYING: MORE THAN HALF THE DAYS
6. BECOMING EASILY ANNOYED OR IRRITABLE: NOT AT ALL

## 2025-06-25 ASSESSMENT — COLUMBIA-SUICIDE SEVERITY RATING SCALE - C-SSRS
2. HAVE YOU ACTUALLY HAD ANY THOUGHTS OF KILLING YOURSELF?: NO
6. HAVE YOU EVER DONE ANYTHING, STARTED TO DO ANYTHING, OR PREPARED TO DO ANYTHING TO END YOUR LIFE?: NO
1. IN THE PAST MONTH, HAVE YOU WISHED YOU WERE DEAD OR WISHED YOU COULD GO TO SLEEP AND NOT WAKE UP?: NO

## 2025-06-25 ASSESSMENT — PAIN SCALES - GENERAL: PAINLEVEL_OUTOF10: 0-NO PAIN

## 2025-06-25 ASSESSMENT — PATIENT HEALTH QUESTIONNAIRE - PHQ9
SUM OF ALL RESPONSES TO PHQ9 QUESTIONS 1 AND 2: 2
1. LITTLE INTEREST OR PLEASURE IN DOING THINGS: SEVERAL DAYS
2. FEELING DOWN, DEPRESSED OR HOPELESS: SEVERAL DAYS

## 2025-06-25 NOTE — PROGRESS NOTES
CHIEF COMPLAINT:  headaches    HISTORY OF PRESENT ILLNESS:  39 year old presented to office to establish care for headaches, referred by ent. Hx of cerebral aneuysm- stent in 3/2017.  After surgery harvinder  gets auras- no migraines.  About 6 months ago stared getting headaches,  one time a day average 3-4 x/week,  20 seconds or less, feels severe stabbing in middle of head.  No vision changes,  no n/v,  no dizziness or lightheadedness, no new acute onset of weakness.  MRA of head ordered by pcp and completed.      3/4/2025 MRA of head: Right internal carotid artery stent seen extending from the proximal  cavernous segment to the clinoid segment without evidence of focal  dissection, obstruction, or remnant aneurysm.  T2 hyperintense fluid can be seen within the left sphenoid sinus and  sphenoid recess with an associated air-fluid level as well as  hyperostosis and mucosal thickening of the sphenoid walls. Trace  amounts of fluid within the left maxillary sinus. Findings are  consistent with sphenoid sinusitis; however, isolated disease of this  extent raises concern for an obstructive lesion. Recommend direct  visualization if possible.  (CT of sinus of completed, saw ent, no new orders)    Chronic Daily stress and anxiety mainly work- therapist every 2 weeks and pcp prescribes anxiety medication.  Denies thoughts or feelings of self harm, nor has plan.     Sleep: Goes to bed about 1030 pm takes anxiety med and muscle relaxer,  gets up to start day at 630 am. Sleeps well at night, average 7- 8 hours, occasionally snoring, once in a while will wake up with dull headache, feels tired during the day.      Caffeine: 2 coffees per day, 2 cans soda on weekends.   Marijuana 5 hits of vape per day,  bong on Saturday and Sunday.  Tobacco use.     All questions and concerns addressed.     Current Outpatient Medications on File Prior to Visit   Medication Sig Dispense Refill    aspirin 81 mg EC tablet Take 1 tablet (81 mg) by  mouth once daily.      baclofen (Lioresal) 10 mg tablet Take 1 tablet (10 mg) by mouth 3 times a day as needed (pain). 90 tablet 5    busPIRone (Buspar) 15 mg tablet Take 1 tablet (15 mg) by mouth 3 times a day as needed (for panic). 90 tablet 3    norethindrone (Ronda) 0.35 mg tablet Take 1 tablet (0.35 mg) over 28 days by mouth once daily. 84 tablet 3    spironolactone (Aldactone) 25 mg tablet Take 1 tablet (25 mg) by mouth once daily. 90 tablet 1     No current facility-administered medications on file prior to visit.         Past Medical History:   Diagnosis Date    Anxiety     Brain aneurysm ([facility]) 2017    Depression          Past Surgical History:   Procedure Laterality Date    OTHER SURGICAL HISTORY  03/06/2020    Gallbladder surgery    OTHER SURGICAL HISTORY  2017    Brain surgery   stent         Family History   Problem Relation Name Age of Onset    No Known Problems Mother      No Known Problems Father      Breast cancer Other           Social History     Tobacco Use    Smoking status: Never    Smokeless tobacco: Never   Substance Use Topics    Alcohol use: Not Currently         ALLERGIES:    Latex      REVIEW OF SYSTEMS:  General:     Appetite change: denies. Chills: denies. Fever: denies.  Allergy/Immunology:     Unusual rection to medications, food, animals or insects reaction: denies.  Ophthalmologic:     Visual acuity change: denies.  ENT:     Decreased hearing: denies.  Endocrine:     Weight loss: denies.  Respiratory:     Cough: denies. Wheezing: denies.  Cardiovascular:     Chest pain: denies. Palpitations: denies.  Gastrointestinal:     Abdominal pain: denies. Difficulty swallowing: denies  Hematology:    Bleeding problems: denies.  Genitourinary:     Painful urination: denies.  Musculoskeletal:     Joint pain: denies. Joint edema: denies.  Skin:     Rash: denies.  Neurologic:     Ataxia: denies, Tremor: denies.  Psychiatric:     Suicidal thoughts: denies.    Also see HPI for elements of ROS  "documented therein and for details of positive findings, which shall supersede the foregoing.      OBJECTIVE:    Objective     Vitals:    06/25/25 1429   BP: 91/56   Pulse: 80   Resp: 16   Temp: 36 °C (96.8 °F)   TempSrc: Skin   Weight: 93.6 kg (206 lb 6.4 oz)   Height: 1.651 m (5' 5\")       Body mass index is 34.35 kg/m².      EXAMINATION:  General Exam: pleasant, well nourished, well developed, in no acute distress  Head: normocephalic, atraumatic  Eyes: extraocular movement intact (EOMI), pupils equal, round, reactive to light, upper eyelids normal , lower eyelids normal  Ears: no obvious hearing deficit  Nose: Nares patent  Neck/Throat: neck supple, full range of motion  Oral Cavity: mucosa moist  Skin: warm and dry  Heart: no murmurs, regular rate and rhythm, S1, S2 normal  Lungs: clear to auscultation bilaterally, good air movement, no wheezes, rales, rhonci, speaks in full sentences  Chest: normal shape and expansion  Abdomen: bowel sounds present, soft, nontender, nondistended, no guarding or rigidity  Extremities: no edema, no cyanosis  Musculoskeletal: no swelling or deformity  Neurologic: nonfocal, alert and oriented, cognitive exam grossly normal, cranial nerves 2-12 grossly intact, motor strength 5/5 bilateral symmetrically, no drift, coordination intact, sensory exam intact, gait normal  Psych: pleasant, cooperative, good eye contact, speech clear, judgement and insight good      ASSESSMENT/PLAN:  1. Severe headache (Primary)  Check MRI of brain  3/4/2025 MRA of head:  without evidence of focal dissection, obstruction, or remnant aneurysm.  - Referral to Neurology  - MR brain w and wo IV contrast; Future    2. ERICA (obstructive sleep apnea)  Check psg   - In-Center Sleep Study; Future    3. Hx of cerebral aneurysm repair  2017    4. Anxiety  Defer to pcp,       Follow up in 4 weeks     I personally spent 45 minutes today, exclusive of procedures, providing care for this patient, including preparation, " face to face time, documentation and other services such as review of medical records, diagnostic result, patient education, counseling, coordination of care as specified in the encounter.             Natalie Leyva, APRN-CNP

## 2025-06-30 NOTE — PROGRESS NOTES
"Subjective   Patient ID:  Priya Lopez is a 39 y.o. female patient who presents today for Anxiety, Depression, and Obesity.    Depression: Patient reports she has been doing well. She does have difficulty falling asleep at night and dislikes using muscle relaxants to help with sleep. She has been taking 1 - 1 1/2 mile walks at night    Intracranial aneurysm: Patients migraines continue. Recently diagnosed with idiopathic intracranial hypertension.     Neck pain: Today she reports neck pain continues, especially at nights. Her new pillow has offered minimal relief. She continues to take baclofen.     Obesity: Patient continues to lose weight and would like to conceive; currently taking pre nayan vitamins.    She reports she has recently had low blood pressures, with a reading of 80/50 recently. There was no associated lightheadedness or dizziness.     Review of Systems   HENT:          Positive for neck pain         Objective   Vitals:  /64   Pulse 71   Temp 36.2 °C (97.1 °F)   Resp 16   Ht 1.651 m (5' 5\")   Wt 91.8 kg (202 lb 6.4 oz)   SpO2 96%   BMI 33.68 kg/m²       Physical Exam  Constitutional:       Appearance: Normal appearance. She is obese.     Cardiovascular:      Rate and Rhythm: Normal rate and regular rhythm.      Pulses: Normal pulses.      Heart sounds: Normal heart sounds.   Pulmonary:      Effort: Pulmonary effort is normal.      Breath sounds: Normal breath sounds.     Neurological:      General: No focal deficit present.      Mental Status: She is alert.     Psychiatric:         Mood and Affect: Mood normal.         Behavior: Behavior normal.         Thought Content: Thought content normal.         Judgment: Judgment normal.         Labs reviewed from :              CMP, CBC, Lipid      Assessment/Plan   Problem List Items Addressed This Visit          Endocrine/Metabolic    Obesity (BMI 30-39.9)    Overview    Is poorly controlled, therapeutic changes necessary.             " Mental Health    Depression    Current Assessment & Plan   This condition is stable, continue with current treatment.              Musculoskeletal and Injuries    Chronic neck pain - Primary    Current Assessment & Plan   Continuing; advise more movement         Relevant Medications    baclofen (Lioresal) 10 mg tablet    Other Relevant Orders    Follow Up In [facility] - PCP - Established           Follow up in: 4 month(s) for follow-up of the above issues or sooner if needed without labs prior.       Scribe Attestation  By signing my name below, IChrissy Scribe attest that this documentation has been prepared under the direction and in the presence of Emory Wei MD.    Scribe Attestation  By signing my name below, I, Lon Lin, attest that this documentation has been prepared under the direction and in the presence of Emory Wei MD.    Provider Attestation  IEmory MD, personally performed the services described in the documentation as scribed by Sravanthi Bernal, in my presence, and confirm it is both accurate and complete.

## 2025-07-16 ENCOUNTER — TELEPHONE (OUTPATIENT)
Dept: NEUROLOGY | Facility: CLINIC | Age: 40
End: 2025-07-16
Payer: COMMERCIAL

## 2025-07-16 DIAGNOSIS — G47.33 OSA (OBSTRUCTIVE SLEEP APNEA): Primary | ICD-10-CM

## 2025-07-29 ENCOUNTER — HOSPITAL ENCOUNTER (OUTPATIENT)
Dept: RADIOLOGY | Facility: CLINIC | Age: 40
Discharge: HOME | End: 2025-07-29
Payer: COMMERCIAL

## 2025-07-29 DIAGNOSIS — R51.9 SEVERE HEADACHE: ICD-10-CM

## 2025-07-29 PROCEDURE — 2550000001 HC RX 255 CONTRASTS: Performed by: NURSE PRACTITIONER

## 2025-07-29 PROCEDURE — 70553 MRI BRAIN STEM W/O & W/DYE: CPT

## 2025-07-29 PROCEDURE — A9575 INJ GADOTERATE MEGLUMI 0.1ML: HCPCS | Performed by: NURSE PRACTITIONER

## 2025-07-29 PROCEDURE — 70553 MRI BRAIN STEM W/O & W/DYE: CPT | Performed by: RADIOLOGY

## 2025-07-29 RX ORDER — GADOTERATE MEGLUMINE 376.9 MG/ML
0.2 INJECTION INTRAVENOUS
Status: COMPLETED | OUTPATIENT
Start: 2025-07-29 | End: 2025-07-29

## 2025-07-29 RX ADMIN — GADOTERATE MEGLUMINE 18.5 ML: 376.9 INJECTION INTRAVENOUS at 11:49

## 2025-08-04 ENCOUNTER — TELEMEDICINE (OUTPATIENT)
Dept: NEUROLOGY | Facility: CLINIC | Age: 40
End: 2025-08-04
Payer: COMMERCIAL

## 2025-08-04 DIAGNOSIS — J32.3 SPHENOID SINUSITIS, UNSPECIFIED CHRONICITY: ICD-10-CM

## 2025-08-04 DIAGNOSIS — G47.33 OSA (OBSTRUCTIVE SLEEP APNEA): Primary | ICD-10-CM

## 2025-08-04 DIAGNOSIS — R51.9 HEADACHE DISORDER: ICD-10-CM

## 2025-08-04 PROCEDURE — 99214 OFFICE O/P EST MOD 30 MIN: CPT | Performed by: NURSE PRACTITIONER

## 2025-08-04 PROCEDURE — 1036F TOBACCO NON-USER: CPT | Performed by: NURSE PRACTITIONER

## 2025-08-06 ENCOUNTER — APPOINTMENT (OUTPATIENT)
Dept: PRIMARY CARE | Facility: CLINIC | Age: 40
End: 2025-08-06
Payer: COMMERCIAL

## 2025-08-06 VITALS
DIASTOLIC BLOOD PRESSURE: 64 MMHG | HEART RATE: 71 BPM | HEIGHT: 65 IN | WEIGHT: 202.4 LBS | SYSTOLIC BLOOD PRESSURE: 106 MMHG | TEMPERATURE: 97.1 F | RESPIRATION RATE: 16 BRPM | BODY MASS INDEX: 33.72 KG/M2 | OXYGEN SATURATION: 96 %

## 2025-08-06 DIAGNOSIS — F32.4 MAJOR DEPRESSIVE DISORDER WITH SINGLE EPISODE, IN PARTIAL REMISSION: ICD-10-CM

## 2025-08-06 DIAGNOSIS — G89.29 CHRONIC NECK PAIN: Primary | ICD-10-CM

## 2025-08-06 DIAGNOSIS — E66.9 OBESITY (BMI 30-39.9): ICD-10-CM

## 2025-08-06 DIAGNOSIS — M54.2 CHRONIC NECK PAIN: Primary | ICD-10-CM

## 2025-08-06 PROCEDURE — 1036F TOBACCO NON-USER: CPT | Performed by: FAMILY MEDICINE

## 2025-08-06 PROCEDURE — 99213 OFFICE O/P EST LOW 20 MIN: CPT | Performed by: FAMILY MEDICINE

## 2025-08-06 PROCEDURE — 3008F BODY MASS INDEX DOCD: CPT | Performed by: FAMILY MEDICINE

## 2025-08-06 RX ORDER — BACLOFEN 10 MG/1
10 TABLET ORAL 3 TIMES DAILY PRN
Qty: 90 TABLET | Refills: 5 | Status: SHIPPED | OUTPATIENT
Start: 2025-08-06

## 2025-08-06 ASSESSMENT — PATIENT HEALTH QUESTIONNAIRE - PHQ9
1. LITTLE INTEREST OR PLEASURE IN DOING THINGS: NOT AT ALL
SUM OF ALL RESPONSES TO PHQ9 QUESTIONS 1 AND 2: 0
2. FEELING DOWN, DEPRESSED OR HOPELESS: NOT AT ALL

## 2025-08-06 ASSESSMENT — ENCOUNTER SYMPTOMS
DEPRESSION: 0
OCCASIONAL FEELINGS OF UNSTEADINESS: 0
LOSS OF SENSATION IN FEET: 0

## 2025-08-06 NOTE — PROGRESS NOTES
CHIEF COMPLAINT:  Test results  headache    HISTORY OF PRESENT ILLNESS:  Virtual Consent    An interactive audio and video telecommunication system which permits real time communications between the patient (at the originating site) and provider (at the distant site) was utilized to provide this telehealth service.   Verbal consent was requested and obtained from Priya Lopez on this date, 08/06/25 for a telehealth visit and the patient's location was confirmed at the time of the visit.     39 year old to follow up on test results and headache with mother.      MR brain w and wo IV contrast  7/29/2025  1. Remodeled and partially empty sella which is an unexpected finding in a patient of this age. This can be seen in the setting of idiopathic intracranial hypertension, however, other supporting findings such as within the optic nerves and transverse sinuses are not seen. If there is concern for idiopathic intracranial hypertension, consider lumbar puncture with opening pressures. 2. Near-complete opacification of left sphenoid sinus with frothy secretions which can be seen in a more acute setting; clinically correlate.     I personally reviewed the images/study and I agree with the findings as stated.   MACRO: None.   Dr. Younger reviewed MRI of brain  no papilledema noted ,  with previous surgery stent  in cavernous ICA an  abnormal sella not concerning at this time given absence of other high pressure symptoms.  Dr. Younger discussed with Dr. Scales ENT positional headaches could be from sphenoid sinusitis,  Dr. To scheduled follow up with patient 8/13/2025.    Discussed findings with patient, no change in status of stabbing pain sensation < 20 seconds, discussed gbn, defers medication at this time.    HSAT: scheduled and pending results.  All questions and concerns addressed.       Initial visit 6/25/2025 MSW: 39 year old presented to office to establish care for headaches, referred by ent. Hx of cerebral  aneuysm- stent in 3/2017.  After surgery harvinder  gets auras- no migraines.  About 6 months ago stared getting headaches,  one time a day average 3-4 x/week,  20 seconds or less, feels severe stabbing in middle of head.  No vision changes,  no n/v,  no dizziness or lightheadedness, no new acute onset of weakness.  MRA of head ordered by pcp and completed.       3/4/2025 MRA of head: Right internal carotid artery stent seen extending from the proximal  cavernous segment to the clinoid segment without evidence of focal  dissection, obstruction, or remnant aneurysm.  T2 hyperintense fluid can be seen within the left sphenoid sinus and  sphenoid recess with an associated air-fluid level as well as  hyperostosis and mucosal thickening of the sphenoid walls. Trace  amounts of fluid within the left maxillary sinus. Findings are  consistent with sphenoid sinusitis; however, isolated disease of this  extent raises concern for an obstructive lesion. Recommend direct  visualization if possible.  (CT of sinus of completed, saw ent, no new orders)     Chronic Daily stress and anxiety mainly work- therapist every 2 weeks and pcp prescribes anxiety medication.  Denies thoughts or feelings of self harm, nor has plan.      Sleep: Goes to bed about 1030 pm takes anxiety med and muscle relaxer,  gets up to start day at 630 am. Sleeps well at night, average 7- 8 hours, occasionally snoring, once in a while will wake up with dull headache, feels tired during the day.       Caffeine: 2 coffees per day, 2 cans soda on weekends.   Marijuana 5 hits of vape per day,  bong on Saturday and Sunday.  Tobacco use.     Current Outpatient Medications on File Prior to Visit   Medication Sig Dispense Refill    busPIRone (Buspar) 15 mg tablet Take 1 tablet (15 mg) by mouth 3 times a day as needed (for panic). 90 tablet 3    norethindrone (Ronda) 0.35 mg tablet Take 1 tablet (0.35 mg) over 28 days by mouth once daily. 84 tablet 3    spironolactone  (Aldactone) 25 mg tablet Take 1 tablet (25 mg) by mouth once daily. 90 tablet 1    [DISCONTINUED] aspirin 81 mg EC tablet Take 1 tablet (81 mg) by mouth once daily.      [DISCONTINUED] baclofen (Lioresal) 10 mg tablet Take 1 tablet (10 mg) by mouth 3 times a day as needed (pain). 90 tablet 5     No current facility-administered medications on file prior to visit.         Past Medical History:   Diagnosis Date    Anxiety 6/2021    Brain aneurysm ([facility]) 2017    Depression 2015    Migraine 2004 then stopped 2017         Past Surgical History:   Procedure Laterality Date    OTHER SURGICAL HISTORY  03/06/2020    Gallbladder surgery    OTHER SURGICAL HISTORY  2017    Brain surgery   stent         Family History   Problem Relation Name Age of Onset    Alcohol abuse Mother Fabiola     Depression Father Jamir     Breast cancer Other      Alcohol abuse Father Jamir     Drug abuse Father Jamir     Cancer Father Jamir     Asthma Sister Mable     Anxiety disorder Sister Mable 20 - 29    Cancer Maternal Grandmother Lali     Dementia Maternal Grandmother Lali 70 - 79    Alzheimer's disease Maternal Grandmother Lali 70 - 79    Asthma Sister Mable     Asthma Sister Mable     Cancer Maternal Grandmother Lali          Social History     Tobacco Use    Smoking status: Never    Smokeless tobacco: Never   Substance Use Topics    Alcohol use: Not Currently     Comment: I drink 1-2 times a year. Usually less than 1 drink         ALLERGIES:    Latex      REVIEW OF SYSTEMS:  General:     Appetite change: denies. Chills: denies. Fever: denies.  Allergy/Immunology:     Unusual rection to medications, food, animals or insects reaction: denies.  Ophthalmologic:     Visual acuity change: denies.  ENT:     Decreased hearing: denies.  Endocrine:     Weight loss: denies.  Respiratory:     Cough: denies. Wheezing: denies.  Cardiovascular:     Chest pain: denies. Palpitations: denies.  Gastrointestinal:     Abdominal pain: denies. Difficulty swallowing:  denies  Hematology:     Bleeding problems: denies.  Genitourinary:     Painful urination: denies.  Musculoskeletal:     Joint pain: denies. Joint edema: denies.  Skin:     Rash: denies.  Neurologic:     Ataxia: denies, Tremor: denies.  Psychiatric:     Suicidal thoughts: denies.    Also see HPI for elements of ROS documented therein and for details of positive findings, which shall supersede the foregoing.      OBJECTIVE:    EXAMINATION:  General Exam: pleasant, well nourished, well developed, in no acute distress  Eyes: extraocular movement intact (EOMI) upper eyelids normal , lower eyelids normal  Neurologic: nonfocal, alert and oriented, cognitive exam grossly normal, cranial nerves 2-12 grossly intact - Exam limited d/t telemedicine  Psych: pleasant, cooperative, good eye contact , speech clear , judgement and insight good      ASSESSMENT/PLAN:  1. ERICA (obstructive sleep apnea) (Primary)  HSAT: schedule pending results     2. Headache disorder  one time a day average 3-4 x/week,  20 seconds or less, feels severe stabbing in middle of head. - Discussed Gabapentin defers medication at this time.   7/29/2025 MRI of brain: see details in hpi- deferring to ENT for further evaluation.   3/4/2025 MRA of head:  without evidence of focal dissection, obstruction, or remnant aneurysm.    3. Sphenoid sinusitis, unspecified chronicity  7/29/2025 MRI of brain: Near-complete opacification of left sphenoid sinus with frothy secretions which can be seen in a more acute setting;   Schedule with ENT Dr. Scales for further evaluation     Follow up for HSAT results or earlier if needed      I personally spent 35 minutes today, exclusive of procedures, providing care for this patient, including preparation, face to face time, documentation and other services such as review of medical records, diagnostic result, patient education, counseling, coordination of care as specified in the encounter.            Natalie Leyva,  APRN-CNP

## 2025-08-13 ENCOUNTER — CLINICAL SUPPORT (OUTPATIENT)
Dept: SLEEP MEDICINE | Facility: HOSPITAL | Age: 40
End: 2025-08-13
Payer: COMMERCIAL

## 2025-08-13 ENCOUNTER — APPOINTMENT (OUTPATIENT)
Facility: CLINIC | Age: 40
End: 2025-08-13
Payer: COMMERCIAL

## 2025-08-13 VITALS — WEIGHT: 207 LBS | HEIGHT: 65 IN | BODY MASS INDEX: 34.49 KG/M2

## 2025-08-13 DIAGNOSIS — J32.3 CHRONIC SPHENOIDAL SINUSITIS: Primary | ICD-10-CM

## 2025-08-13 DIAGNOSIS — G47.33 OSA (OBSTRUCTIVE SLEEP APNEA): ICD-10-CM

## 2025-08-13 PROCEDURE — 99214 OFFICE O/P EST MOD 30 MIN: CPT | Performed by: OTOLARYNGOLOGY

## 2025-08-13 PROCEDURE — 3008F BODY MASS INDEX DOCD: CPT | Performed by: OTOLARYNGOLOGY

## 2025-08-25 ENCOUNTER — TELEPHONE (OUTPATIENT)
Dept: NEUROLOGY | Facility: CLINIC | Age: 40
End: 2025-08-25
Payer: COMMERCIAL

## 2025-08-25 DIAGNOSIS — F41.9 ANXIETY: ICD-10-CM

## 2025-08-26 RX ORDER — SPIRONOLACTONE 25 MG/1
25 TABLET ORAL DAILY
Qty: 90 TABLET | Refills: 1 | Status: SHIPPED | OUTPATIENT
Start: 2025-08-26

## 2025-08-27 ENCOUNTER — APPOINTMENT (OUTPATIENT)
Dept: SLEEP MEDICINE | Facility: CLINIC | Age: 40
End: 2025-08-27
Payer: COMMERCIAL

## 2025-09-09 ENCOUNTER — APPOINTMENT (OUTPATIENT)
Dept: NEUROLOGY | Facility: CLINIC | Age: 40
End: 2025-09-09
Payer: COMMERCIAL

## 2025-10-13 ENCOUNTER — APPOINTMENT (OUTPATIENT)
Dept: NEUROLOGY | Facility: CLINIC | Age: 40
End: 2025-10-13
Payer: COMMERCIAL

## 2025-10-15 ENCOUNTER — APPOINTMENT (OUTPATIENT)
Facility: CLINIC | Age: 40
End: 2025-10-15
Payer: COMMERCIAL

## 2025-12-12 ENCOUNTER — APPOINTMENT (OUTPATIENT)
Dept: PRIMARY CARE | Facility: CLINIC | Age: 40
End: 2025-12-12
Payer: COMMERCIAL